# Patient Record
Sex: MALE | Race: WHITE | NOT HISPANIC OR LATINO | Employment: OTHER | URBAN - METROPOLITAN AREA
[De-identification: names, ages, dates, MRNs, and addresses within clinical notes are randomized per-mention and may not be internally consistent; named-entity substitution may affect disease eponyms.]

---

## 2023-08-01 ENCOUNTER — HOSPITAL ENCOUNTER (OUTPATIENT)
Dept: DATA CONVERSION | Facility: HOSPITAL | Age: 63
End: 2023-08-01
Attending: UROLOGY | Admitting: UROLOGY
Payer: MEDICARE

## 2023-08-01 DIAGNOSIS — N40.1 BENIGN PROSTATIC HYPERPLASIA WITH LOWER URINARY TRACT SYMPTOMS: ICD-10-CM

## 2023-09-21 ENCOUNTER — OFFICE VISIT (OUTPATIENT)
Dept: PRIMARY CARE | Facility: CLINIC | Age: 63
End: 2023-09-21
Payer: MEDICARE

## 2023-09-21 VITALS
WEIGHT: 150 LBS | DIASTOLIC BLOOD PRESSURE: 82 MMHG | BODY MASS INDEX: 23.54 KG/M2 | OXYGEN SATURATION: 97 % | HEIGHT: 67 IN | HEART RATE: 70 BPM | SYSTOLIC BLOOD PRESSURE: 120 MMHG

## 2023-09-21 DIAGNOSIS — R97.20 ELEVATED PSA: ICD-10-CM

## 2023-09-21 DIAGNOSIS — Z53.20 SCREENING FOR HEPATITIS C DECLINED: ICD-10-CM

## 2023-09-21 DIAGNOSIS — Z12.11 SCREENING FOR COLORECTAL CANCER: ICD-10-CM

## 2023-09-21 DIAGNOSIS — Z23 NEED FOR VACCINATION: ICD-10-CM

## 2023-09-21 DIAGNOSIS — R42 DIZZINESS: ICD-10-CM

## 2023-09-21 DIAGNOSIS — N40.0 BENIGN PROSTATIC HYPERPLASIA, UNSPECIFIED WHETHER LOWER URINARY TRACT SYMPTOMS PRESENT: ICD-10-CM

## 2023-09-21 DIAGNOSIS — E78.2 MIXED HYPERLIPIDEMIA: ICD-10-CM

## 2023-09-21 DIAGNOSIS — H40.9 GLAUCOMA OF BOTH EYES, UNSPECIFIED GLAUCOMA TYPE: ICD-10-CM

## 2023-09-21 DIAGNOSIS — Z71.89 ADVANCED DIRECTIVES, COUNSELING/DISCUSSION: ICD-10-CM

## 2023-09-21 DIAGNOSIS — E55.9 VITAMIN D DEFICIENCY: ICD-10-CM

## 2023-09-21 DIAGNOSIS — I10 ESSENTIAL HYPERTENSION: ICD-10-CM

## 2023-09-21 DIAGNOSIS — Z23 ENCOUNTER FOR IMMUNIZATION: ICD-10-CM

## 2023-09-21 DIAGNOSIS — Z00.00 WELCOME TO MEDICARE PREVENTIVE VISIT: Primary | ICD-10-CM

## 2023-09-21 DIAGNOSIS — N31.9 NEUROGENIC BLADDER: ICD-10-CM

## 2023-09-21 DIAGNOSIS — Z12.12 SCREENING FOR COLORECTAL CANCER: ICD-10-CM

## 2023-09-21 DIAGNOSIS — Z53.20 HIV SCREENING DECLINED: ICD-10-CM

## 2023-09-21 PROBLEM — R33.9 URINARY RETENTION: Status: ACTIVE | Noted: 2023-09-21

## 2023-09-21 PROBLEM — E78.5 HYPERLIPIDEMIA: Status: ACTIVE | Noted: 2023-09-21

## 2023-09-21 PROBLEM — K64.9 HEMORRHOIDS: Status: RESOLVED | Noted: 2023-09-21 | Resolved: 2023-09-21

## 2023-09-21 PROBLEM — K64.8 INTERNAL HEMORRHOIDS WITHOUT COMPLICATION: Status: ACTIVE | Noted: 2023-09-21

## 2023-09-21 PROBLEM — K64.9 HEMORRHOIDS: Status: ACTIVE | Noted: 2023-09-21

## 2023-09-21 PROBLEM — K64.8 INTERNAL HEMORRHOIDS WITHOUT COMPLICATION: Status: RESOLVED | Noted: 2023-09-21 | Resolved: 2023-09-21

## 2023-09-21 PROCEDURE — 99214 OFFICE O/P EST MOD 30 MIN: CPT | Performed by: FAMILY MEDICINE

## 2023-09-21 PROCEDURE — 90662 IIV NO PRSV INCREASED AG IM: CPT | Performed by: FAMILY MEDICINE

## 2023-09-21 PROCEDURE — 3079F DIAST BP 80-89 MM HG: CPT | Performed by: FAMILY MEDICINE

## 2023-09-21 PROCEDURE — G0009 ADMIN PNEUMOCOCCAL VACCINE: HCPCS | Performed by: FAMILY MEDICINE

## 2023-09-21 PROCEDURE — 93000 ELECTROCARDIOGRAM COMPLETE: CPT | Performed by: FAMILY MEDICINE

## 2023-09-21 PROCEDURE — 3074F SYST BP LT 130 MM HG: CPT | Performed by: FAMILY MEDICINE

## 2023-09-21 PROCEDURE — 1170F FXNL STATUS ASSESSED: CPT | Performed by: FAMILY MEDICINE

## 2023-09-21 PROCEDURE — G0008 ADMIN INFLUENZA VIRUS VAC: HCPCS | Performed by: FAMILY MEDICINE

## 2023-09-21 PROCEDURE — 1160F RVW MEDS BY RX/DR IN RCRD: CPT | Performed by: FAMILY MEDICINE

## 2023-09-21 PROCEDURE — 3078F DIAST BP <80 MM HG: CPT | Performed by: FAMILY MEDICINE

## 2023-09-21 PROCEDURE — 90677 PCV20 VACCINE IM: CPT | Performed by: FAMILY MEDICINE

## 2023-09-21 PROCEDURE — 1036F TOBACCO NON-USER: CPT | Performed by: FAMILY MEDICINE

## 2023-09-21 PROCEDURE — 1159F MED LIST DOCD IN RCRD: CPT | Performed by: FAMILY MEDICINE

## 2023-09-21 PROCEDURE — G0402 INITIAL PREVENTIVE EXAM: HCPCS | Performed by: FAMILY MEDICINE

## 2023-09-21 RX ORDER — IBUPROFEN 100 MG/5ML
1000 SUSPENSION, ORAL (FINAL DOSE FORM) ORAL
COMMUNITY

## 2023-09-21 RX ORDER — VIT C/E/ZN/COPPR/LUTEIN/ZEAXAN 250MG-90MG
1 CAPSULE ORAL
COMMUNITY

## 2023-09-21 RX ORDER — TAMSULOSIN HYDROCHLORIDE 0.4 MG/1
2 CAPSULE ORAL DAILY
COMMUNITY
Start: 2021-04-29

## 2023-09-21 RX ORDER — BRIMONIDINE TARTRATE 2 MG/ML
1 SOLUTION/ DROPS OPHTHALMIC 3 TIMES DAILY
COMMUNITY

## 2023-09-21 RX ORDER — BESIFLOXACIN 6 MG/ML
SUSPENSION OPHTHALMIC
COMMUNITY
Start: 2023-05-01

## 2023-09-21 RX ORDER — LOSARTAN POTASSIUM 50 MG/1
50 TABLET ORAL
COMMUNITY
Start: 2018-06-14 | End: 2023-09-21 | Stop reason: WASHOUT

## 2023-09-21 RX ORDER — PHENOL 1.4 %
1 AEROSOL, SPRAY (ML) MUCOUS MEMBRANE DAILY
COMMUNITY
Start: 2021-10-19

## 2023-09-21 RX ORDER — BIMATOPROST 0.1 MG/ML
1 SOLUTION/ DROPS OPHTHALMIC
COMMUNITY
Start: 2016-11-03 | End: 2023-11-05

## 2023-09-21 ASSESSMENT — ENCOUNTER SYMPTOMS
SHORTNESS OF BREATH: 0
CHEST TIGHTNESS: 0
ABDOMINAL PAIN: 0
DYSPHORIC MOOD: 0
CONFUSION: 0
CONSTIPATION: 0
VOMITING: 0
COUGH: 0
CHILLS: 0
SINUS PAIN: 0
NUMBNESS: 0
POLYDIPSIA: 0
ADENOPATHY: 0
POLYPHAGIA: 0
LIGHT-HEADEDNESS: 0
DEPRESSION: 0
DYSURIA: 0
NERVOUS/ANXIOUS: 0
DIZZINESS: 0
UNEXPECTED WEIGHT CHANGE: 0
NAUSEA: 0
DIAPHORESIS: 0
HEMATURIA: 0
DIARRHEA: 0
PALPITATIONS: 0
WHEEZING: 0
SORE THROAT: 0
HEADACHES: 0
FEVER: 0
FREQUENCY: 0
SINUS PRESSURE: 0
OCCASIONAL FEELINGS OF UNSTEADINESS: 1
LOSS OF SENSATION IN FEET: 0

## 2023-09-21 ASSESSMENT — PATIENT HEALTH QUESTIONNAIRE - PHQ9
2. FEELING DOWN, DEPRESSED OR HOPELESS: NOT AT ALL
SUM OF ALL RESPONSES TO PHQ9 QUESTIONS 1 AND 2: 0
SUM OF ALL RESPONSES TO PHQ9 QUESTIONS 1 AND 2: 0
1. LITTLE INTEREST OR PLEASURE IN DOING THINGS: NOT AT ALL
1. LITTLE INTEREST OR PLEASURE IN DOING THINGS: NOT AT ALL
2. FEELING DOWN, DEPRESSED OR HOPELESS: NOT AT ALL

## 2023-09-21 ASSESSMENT — ACTIVITIES OF DAILY LIVING (ADL)
TAKING_MEDICATION: INDEPENDENT
DRESSING: INDEPENDENT
DOING_HOUSEWORK: INDEPENDENT
GROCERY_SHOPPING: INDEPENDENT
BATHING: INDEPENDENT
MANAGING_FINANCES: INDEPENDENT

## 2023-09-21 NOTE — PROGRESS NOTES
"Subjective   Reason for Visit: Patel Greer is an 63 y.o. male here for a Medicare Wellness visit.     Past Medical, Surgical, and Family History reviewed and updated in chart.    Reviewed all medications by prescribing practitioner or clinical pharmacist (such as prescriptions, OTCs, herbal therapies and supplements) and documented in the medical record.    routine follow up. chronic issues as per assessment and plan.     Has been having some issues with dizziness over the past week or so. Has been monitoring blood pressures at home. Overall pretty good --> 120s over 80s. Has had one low blood pressureat 85/63 and occasional blood pressure of low 100s over 70s. Occasional systolic as high as 100 but overall in the 80s. Takes flmax 0.8 mg at bedtime for his prostate.         Patient Care Team:  Britt Dawkins MD as PCP - General  Medhat Kolb MD as Surgeon (Urology)     Review of Systems   Constitutional:  Negative for chills, diaphoresis, fever and unexpected weight change.   HENT:  Negative for congestion, sinus pressure, sinus pain, sneezing and sore throat.    Respiratory:  Negative for cough, chest tightness, shortness of breath and wheezing.    Cardiovascular:  Negative for chest pain, palpitations and leg swelling.   Gastrointestinal:  Negative for abdominal pain, constipation, diarrhea, nausea and vomiting.   Endocrine: Negative for cold intolerance, heat intolerance, polydipsia, polyphagia and polyuria.   Genitourinary:  Negative for dysuria, frequency, hematuria and urgency.   Neurological:  Negative for dizziness, syncope, light-headedness, numbness and headaches.   Hematological:  Negative for adenopathy.   Psychiatric/Behavioral:  Negative for confusion and dysphoric mood. The patient is not nervous/anxious.        Objective   Vitals:  /82 (BP Location: Left arm, Patient Position: Standing, BP Cuff Size: Adult)   Pulse 70   Ht 1.702 m (5' 7\")   Wt 68 kg (150 lb)   SpO2 97%   BMI 23.49 kg/m²   "     Physical Exam  Vitals and nursing note reviewed.   Constitutional:       General: He is not in acute distress.     Appearance: Normal appearance.   HENT:      Head: Normocephalic and atraumatic.      Nose: Nose normal.   Eyes:      Extraocular Movements: Extraocular movements intact.      Conjunctiva/sclera: Conjunctivae normal.      Pupils: Pupils are equal, round, and reactive to light.   Cardiovascular:      Rate and Rhythm: Normal rate and regular rhythm.      Heart sounds: No murmur heard.     No friction rub. No gallop.   Pulmonary:      Effort: Pulmonary effort is normal.      Breath sounds: Normal breath sounds. No wheezing, rhonchi or rales.   Abdominal:      General: Bowel sounds are normal. There is no distension.      Palpations: Abdomen is soft.      Tenderness: There is no abdominal tenderness.   Musculoskeletal:         General: Normal range of motion.      Cervical back: Normal range of motion and neck supple.   Skin:     General: Skin is warm and dry.   Neurological:      General: No focal deficit present.      Mental Status: He is alert and oriented to person, place, and time.      Deep Tendon Reflexes: Reflexes normal.   Psychiatric:         Mood and Affect: Mood normal.         Behavior: Behavior normal.         Thought Content: Thought content normal.         Judgment: Judgment normal.         Assessment/Plan   Problem List Items Addressed This Visit       Advanced directives, counseling/discussion    Current Assessment & Plan     - discussed with patient and handout provided          BPH (benign prostatic hyperplasia)    Current Assessment & Plan     - follows with urology, Dr. Kolb         Relevant Orders    CBC and Auto Differential    TSH with reflex to Free T4 if abnormal    Dizziness    Current Assessment & Plan     - check labs NSR. No ST changes   - EKG today shows  - orthostatics negative         Relevant Orders    ECG 12 Lead (Completed)    Vascular US Carotid Artery Duplex  Bilateral    Elevated PSA    Current Assessment & Plan     - follows with urology, Dr. Kolb         Relevant Orders    CBC and Auto Differential    TSH with reflex to Free T4 if abnormal    PSA    Essential hypertension    Current Assessment & Plan     - controlled off medication  - continue to monitor         Relevant Orders    CBC and Auto Differential    Comprehensive Metabolic Panel    TSH with reflex to Free T4 if abnormal    Glaucoma, bilateral    Current Assessment & Plan     - follows with ophthalmology          Mixed hyperlipidemia    Current Assessment & Plan     - Encouraged healthy lifestyle, including adequate exercise and high fiber, low fat and low carb diet.          Relevant Orders    CBC and Auto Differential    Comprehensive Metabolic Panel    Lipid Panel    Neurogenic bladder    Current Assessment & Plan     - follows with urology, Dr. Kolb         Vitamin D deficiency    Current Assessment & Plan     - check labs          Relevant Orders    CBC and Auto Differential    Vitamin D 25-Hydroxy,Total (for eval of Vitamin D levels)     Other Visit Diagnoses       Welcome to Medicare preventive visit    -  Primary    Screening for hepatitis C declined        HIV screening declined        Need for vaccination        Relevant Orders    Pneumococcal conjugate vaccine 20-valent IM (Completed)    Screening for colorectal cancer        Relevant Orders    Cologuard® colon cancer screening    Encounter for immunization        Relevant Orders    Flu vaccine, high dose seasonal, preservative free (Completed)

## 2023-09-21 NOTE — PATIENT INSTRUCTIONS
Patel S Post ,    Thank you for coming in today. We at [facility] appreciate your trust in our care. If you have any questions or concerns about the care you received today, please do not hesitate to contact us at [phone].    The following instructions were discussed today:    - I recommend getting Shingrix (shingles) vaccine at your pharmacy.    - I recommend getting COVID-19 booster at your pharmacy   - get carotid ultrasounds  - get labs  - For blood work: Nothing to eat or drink for at least 10 hours prior. Okay for water or black coffee.   - Follow up in 1 month for virtual to re-evaluate dizziness  - Follow up in 1 year.

## 2023-09-22 LAB — PROSTATE SPECIFIC AG (NG/ML) IN SER/PLAS: 13.27 NG/ML (ref 0–4)

## 2023-09-29 VITALS — HEIGHT: 67 IN | WEIGHT: 154.32 LBS | BODY MASS INDEX: 24.22 KG/M2

## 2023-09-30 NOTE — H&P
History & Physical Reviewed:   I have reviewed the History and Physical dated:  13-Jul-2023   History and Physical reviewed and relevant findings noted. Patient examined to review pertinent physical  findings.: No significant changes   Home Medications Reviewed: no changes noted   Allergies Reviewed: no changes noted       ERAS (Enhanced Recovery After Surgery):  ·  ERAS Patient: no     Consent:   COVID-19 Consent:  ·  COVID-19 Risk Consent Surgeon has reviewed key risks related to the risk of haseeb COVID-19 and if they contract COVID-19 what the risks are.       Electronic Signatures:  Medhat Kolb)  (Signed 01-Aug-2023 10:13)   Authored: History & Physical Reviewed, ERAS, Consent,  Note Completion      Last Updated: 01-Aug-2023 10:13 by Medhat Kolb)

## 2023-10-01 NOTE — OP NOTE
PROCEDURE DETAILS    Preoperative Diagnosis:  Benign localized prostatic hyperplasia with lower urinary tract symptoms (LUTS), N40.1    Postoperative Diagnosis:  Benign localized prostatic hyperplasia with lower urinary tract symptoms (LUTS), N40.1    Surgeon: Medhat Kolb  Resident/Fellow/Other Assistant: None of these were associated with this case    Procedure:  1. green light laser prostatctomy    Anesthesia: General anesthesia  Estimated Blood Loss: 1cc  Findings: 50 g prostate  Specimens(s) Collected: no,     Complications: None  Drains and/or Catheters: 20 English 30 cc balloon catheter two way, 40 cc balloon  Patient Returned To/Condition: PACU in a stable condition        Operative Report:   Patient was identified in the preoperative holding area and brought into the room, placed in a supine position. After a general anesthesia was induced, patient  was repositioned in a dorsal lithotomy position, genitalia area was prepped and draped in a routine standard fashion. A cystoscopy was performed with a 22F Olympus cystoscope, and the findings include normal anterior urethra, significant enlarged prostate,  trabeculated bladder, no tumor no stone. At this point greenlight laser fiber brought in a standard laser vaporization was performed, starting at the median lobe first, then left and right lateral lobe, finally anterior lobe both ureteral orifices are  intact as well as sphincter. There is no bleeding throughout the case, a 22 English Sharp catheter inserted and balloon inflated up to 40 cc. Bladder was irrigated and the urine is clear.  Patient extubated and returned to PACU in a stable condition.  Plan  Cipro 500 mg twice a day for 7 days  Norco x20  Appointment in 1 week for voiding trial                        Attestation:   Note Completion:  Attending Attestation I performed the procedure without a resident         Electronic Signatures:  Medhat Kolb)  (Signed 01-Aug-2023 11:31)   Authored:  Post-Operative Note, Chart Review, Note Completion      Last Updated: 01-Aug-2023 11:31 by Medhat Kolb)

## 2023-10-04 ENCOUNTER — TELEPHONE (OUTPATIENT)
Dept: PRIMARY CARE | Facility: CLINIC | Age: 63
End: 2023-10-04

## 2023-10-04 ENCOUNTER — HOSPITAL ENCOUNTER (OUTPATIENT)
Dept: VASCULAR MEDICINE | Facility: HOSPITAL | Age: 63
Discharge: HOME | End: 2023-10-04
Payer: MEDICARE

## 2023-10-04 DIAGNOSIS — R42 DIZZINESS: ICD-10-CM

## 2023-10-04 PROCEDURE — 93880 EXTRACRANIAL BILAT STUDY: CPT | Performed by: SURGERY

## 2023-10-04 PROCEDURE — 93880 EXTRACRANIAL BILAT STUDY: CPT

## 2023-10-27 ENCOUNTER — TELEMEDICINE (OUTPATIENT)
Dept: PRIMARY CARE | Facility: CLINIC | Age: 63
End: 2023-10-27
Payer: MEDICARE

## 2023-10-27 DIAGNOSIS — R42 DIZZINESS: Primary | ICD-10-CM

## 2023-10-27 PROCEDURE — 99442 PR PHYS/QHP TELEPHONE EVALUATION 11-20 MIN: CPT | Performed by: FAMILY MEDICINE

## 2023-10-27 ASSESSMENT — ENCOUNTER SYMPTOMS
POLYPHAGIA: 0
POLYDIPSIA: 0
FREQUENCY: 0
COUGH: 0
NUMBNESS: 0
LIGHT-HEADEDNESS: 0
DIZZINESS: 0
CHEST TIGHTNESS: 0
SINUS PAIN: 0
ADENOPATHY: 0
DIARRHEA: 0
HEMATURIA: 0
HEADACHES: 0
SINUS PRESSURE: 0
NERVOUS/ANXIOUS: 0
DIAPHORESIS: 0
SORE THROAT: 0
SHORTNESS OF BREATH: 0
DYSPHORIC MOOD: 0
UNEXPECTED WEIGHT CHANGE: 0
FEVER: 0
CHILLS: 0
NAUSEA: 0
WHEEZING: 0
VOMITING: 0
PALPITATIONS: 0
DYSURIA: 0
CONFUSION: 0
CONSTIPATION: 0
ABDOMINAL PAIN: 0

## 2023-10-27 NOTE — PROGRESS NOTES
Subjective   Patient ID: Patel Greer is a 63 y.o. male who presents for Follow-up.    Virtual or Telephone Consent    A telephone visit (audio only) between the patient (at the originating site) and the provider (at the distant site) was utilized to provide this telehealth service.   Verbal consent was requested and obtained from Patel Greer on this date, 10/27/23 for a telehealth visit.      HPI    Follow up on dizziness. See 9/21/23 office visit for details. EKG showed NSR. Had negative orthostatics. Carotid dopplers negative.     He states today that he thinks he has the problem solved. He was taking his flomax at bedtime previously. Now he takes it with his evening meal instead. No longer experiencing dizziness.       Review of Systems   Constitutional:  Negative for chills, diaphoresis, fever and unexpected weight change.   HENT:  Negative for congestion, sinus pressure, sinus pain, sneezing and sore throat.    Respiratory:  Negative for cough, chest tightness, shortness of breath and wheezing.    Cardiovascular:  Negative for chest pain, palpitations and leg swelling.   Gastrointestinal:  Negative for abdominal pain, constipation, diarrhea, nausea and vomiting.   Endocrine: Negative for cold intolerance, heat intolerance, polydipsia, polyphagia and polyuria.   Genitourinary:  Negative for dysuria, frequency, hematuria and urgency.   Neurological:  Negative for dizziness, syncope, light-headedness, numbness and headaches.   Hematological:  Negative for adenopathy.   Psychiatric/Behavioral:  Negative for confusion and dysphoric mood. The patient is not nervous/anxious.          Assessment/Plan   Problem List Items Addressed This Visit       Dizziness - Primary     - EKG NSR  - orthostatics negative  - carotid dopplers negative  - has completely resolved now that he is taking his flomax with dinner instead of at bedtime.             This telephone visit lasted approximately 11 minutes.

## 2023-10-27 NOTE — ASSESSMENT & PLAN NOTE
- EKG NSR  - orthostatics negative  - carotid dopplers negative  - has completely resolved now that he is taking his flomax with dinner instead of at bedtime.

## 2023-10-27 NOTE — PATIENT INSTRUCTIONS
Patel S Post ,    Thank you for coming in today. We at [facility] appreciate your trust in our care. If you have any questions or concerns about the care you received today, please do not hesitate to contact us at [phone].    The following instructions were discussed today:    - Follow up 9/19/24 as scheduled.

## 2023-11-14 LAB — NONINV COLON CA DNA+OCC BLD SCRN STL QL: NEGATIVE

## 2024-03-19 ENCOUNTER — LAB (OUTPATIENT)
Dept: LAB | Facility: LAB | Age: 64
End: 2024-03-19
Payer: MEDICARE

## 2024-03-19 DIAGNOSIS — R97.20 ELEVATED PROSTATE SPECIFIC ANTIGEN (PSA): Primary | ICD-10-CM

## 2024-03-19 DIAGNOSIS — R97.20 ELEVATED PROSTATE SPECIFIC ANTIGEN (PSA): ICD-10-CM

## 2024-03-19 LAB — PSA SERPL-MCNC: 12.9 NG/ML

## 2024-03-19 PROCEDURE — 36415 COLL VENOUS BLD VENIPUNCTURE: CPT

## 2024-03-19 PROCEDURE — 84153 ASSAY OF PSA TOTAL: CPT

## 2024-04-26 ENCOUNTER — OFFICE VISIT (OUTPATIENT)
Dept: UROLOGY | Facility: CLINIC | Age: 64
End: 2024-04-26
Payer: MEDICARE

## 2024-04-26 DIAGNOSIS — N40.0 BENIGN PROSTATIC HYPERPLASIA, UNSPECIFIED WHETHER LOWER URINARY TRACT SYMPTOMS PRESENT: ICD-10-CM

## 2024-04-26 DIAGNOSIS — R97.20 ELEVATED PSA: Primary | ICD-10-CM

## 2024-04-26 LAB
POC BILIRUBIN, URINE: NEGATIVE
POC BLOOD, URINE: NEGATIVE
POC GLUCOSE, URINE: NEGATIVE MG/DL
POC KETONES, URINE: NEGATIVE MG/DL
POC LEUKOCYTES, URINE: NEGATIVE
POC NITRITE,URINE: NEGATIVE
POC PH, URINE: 7 PH
POC PROTEIN, URINE: NEGATIVE MG/DL
POC SPECIFIC GRAVITY, URINE: 1.01
POC UROBILINOGEN, URINE: 0.2 EU/DL

## 2024-04-26 PROCEDURE — 1159F MED LIST DOCD IN RCRD: CPT | Performed by: UROLOGY

## 2024-04-26 PROCEDURE — 99214 OFFICE O/P EST MOD 30 MIN: CPT | Performed by: UROLOGY

## 2024-04-26 PROCEDURE — 81002 URINALYSIS NONAUTO W/O SCOPE: CPT | Performed by: UROLOGY

## 2024-04-26 RX ORDER — FINASTERIDE 5 MG/1
5 TABLET, FILM COATED ORAL DAILY
Qty: 30 TABLET | Refills: 0 | Status: SHIPPED | OUTPATIENT
Start: 2024-04-26 | End: 2024-05-21 | Stop reason: SDUPTHER

## 2024-04-26 NOTE — PROGRESS NOTES
"4/26/2024  Complaining some dizzy spell, voiding well, on Flomax 0.8 mg daily    Patient has no nausea, no vomiting, no fever.    GRAYSON: Deferred    PSA: 12.9, stable    We discussed benign prostate hypertrophy, Flomax, dizzy spell  We discussed the DC Flomax, Proscar trial 5 mg daily for 30 days  We discussed elevated PSA but stable, repeat prostate biopsy versus continue monitoring PSA  All the questions were answered, the patient expressed understanding and agreed to the plan.    Impression  History of urinary retention  BPH, status post the PVP  Urinary retention  Elevated PSA, multiple negative biopsy     Plan  DC Flomax  Proscar 5 mg daily x 30-day, call back for refill  Yearly GRAYSON and PSA      Chief Complaint   Patient presents with    Benign Prostatic Hypertrophy     Patient here today for yearly follow up due to BPH.  Patient voiding well, nocturia x 2  Patient states the Flomax has been making him very dizzy, would like to discuss.    PSA 3/19/2024  12.90    Last Visit Plan:  Continue Flomax 0.8 mg  Check PSA          Physical Exam     TODAYS LAB RESULTS:  Component  Ref Range & Units 08:08   POC Glucose, Urine  NEGATIVE mg/dl NEGATIVE   POC Bilirubin, Urine  NEGATIVE NEGATIVE   POC Ketones, Urine  NEGATIVE mg/dl NEGATIVE   POC Specific Gravity, Urine  1.005 - 1.035 1.010   POC Blood, Urine  NEGATIVE NEGATIVE   POC PH, Urine  No Reference Range Established PH 7.0   POC Protein, Urine  NEGATIVE, 30 (1+) mg/dl NEGATIVE   POC Urobilinogen, Urine  0.2, 1.0 EU/DL 0.2   Poc Nitrite, Urine  NEGATIVE NEGATIVE   POC Leukocytes, Urine  NEGATIVE NEGATIVE       ASSESSMENT&PLAN:      IMPRESSIONS:     08/14/2023  Patient here for second voiding trial     Patient here today 1 week f/u. Patient presents with Indwelling catheter placed 8/8/2023.  Patient had green light laser 8/1/2023. Patient would like to talk to Dr. Kolb regarding next \"steps.\"  Sowmya Gibbons LPN     We discussed benign prostate hypertrophy, status post " the PVP, failed voiding trial x1 and a repeat voiding trial needed  We discussed continue Flomax 0.8 mg daily for now  All the questions were answered, the patient expressed understanding and agreed to the plan.     Impression  Intractable urinary retention  BPH  Urinary retention  Elevated PSA, multiple negative biopsy     Plan  Voiding trial x 2, call back 3 PM  Appointment in 1 week for PVR  Continue Flomax 0.8 mg daily for now        08/07/2023  Did well after PVP     Patient has no nausea, no vomiting, no fever.     Exam: Sharp catheter in place, urine clear     Catheter was removed without difficulty     Patient here today for 1wk follow up after green light laser prostatectomy done 8/01/23.      Patient would like to know if he is to continue the Flomax 0.8mg daily.  He also wants to know if he is able to do any exercising/if he has restrictions.      -Kenneth LACEY     We discussed the benign prostate hypertrophy, status post PVP greenlight laser prostatectomy, voiding trial  We discussed continue Flomax 2 a day until finished the bottle  All the questions were answered, the patient expressed understanding and agreed to the plan.     Impression  Intractable urinary retention  BPH  Urinary retention  Elevated PSA, multiple negative biopsy     Plan  Voiding trial, call back 3 PM  Appointment in 1 week for PVR  Continue Flomax 0.8 mg daily for now        07/13/2023  Cystoscopy     A cystoscopy was performed under local without difficulty     Findings: Normal anterior urethra, mild to moderate enlarged prostate, mild trabeculated bladder no tumor no stone     Pain evaluation: 0/10 before, 2/10 after.     Transrectal ultrasound prostate performed without difficulty     Volume 55 cc     We discussed benign prostate hypertrophy, intractable urinary retention  We discussed the PVP greenlight laser prostatectomy, indication risk of benefit and alternative,  All the questions were answered, the patient expressed  understanding and agreed to the plan.        Impression  Intractable urinary retention  BPH  Urinary retention  Elevated PSA, multiple negative biopsy     Plan  Continue Sharp catheter for now  PVP greenlight laser prostatectomy  Appointment 1 week after        07/07/2023 3:06 addendum     Patient unable to void, PVR more than 350 cc, very anxious     Plan  Sharp catheter  Prostate ultrasound  Cystoscopy  Likely proceed PVP greenlight laser prostatectomy        07/07/2023  Here for second voiding trial     Exam: Sharp catheter in place, urine clear yellow     Sharp catheter was removed without difficulty     Patient here today for 2nd 1wk voiding trial. He failed last voiding trial done 6/30/23.   Urine culture 6/22/23 negative.      Patient is wondering if he needs needs to increase Flomax dosage to help with voiding issues? He also had Cataract surgery in April and is wondering if this is related to retention as well.   Patient is very anxious about voiding trial and why he is having these issues.     -JMorgenstern CMA     We discussed benign prostate hypertrophy, acute urinary retention, second voiding trial   We discussed increase Flomax to 0.8 mg daily  We discussed possible BPH work-up including ultrasound and cystoscopy  All the questions were answered, the patient expressed understanding and agreed to the plan.     Impression  Acute urinary retention  BPH  Urinary retention  Elevated PSA     Plan  Voiding trial today, call back 2 PM  Change Flomax to 0.8 mg daily  Keep regular appointment        06/30/2023  Acute onset of urinary retention, Sharp catheter was inserted a week ago in ER     Patient has no nausea, no vomiting, no fever.     Exam: Sharp catheter in place, urine clear yellow     Catheter was removed without difficulty     Patient here for ED follow up. Patient went to ER 06/22/23 complaints of urinary retention. Catheter was inserted and 750ml was drained from bladder. He is to follow up here,  there has been blood around the tip of his penis. No blood in bag. He is still taking flomax daily.      We discussed benign prostate hypertrophy, acute urinary retention, voiding trial  We discussed continue Flomax use  All the questions were answered, the patient expressed understanding and agreed to the plan.     Impression  Acute urinary retention  BPH  Urinary retention  Elevated PSA     Plan  Voiding trial today, call back 2 PM  Call back in 1 week  Keep regular appointment           05/15/2023  Voiding well, nocturia 0-1, on Flomax 0.4 mg daily     Patient has no nausea, no vomiting, no fever.      GRAYSON: 2+, no nodule     PSA: Pending     Patient here today for year check of BPH, elevated PSA, urinary retention. He was to continue Flomax 0.8mg daily and had catheter in for 1wk, with successful voiding trial on 6/27/22. He did not follow up in office for PVR.   Patient denies dysuria, burning, hematuria. Nocturia x1 occasionally. Denies weak or intermittent urinary stream, states he feels he can void fine with no issues.  Patient is taking Flomax 0.4mg daily- he will need a refill of this.   PVR 53ml  -JMorgenstern CMA     IO Glucose - Urine Negative REQUIRED    IO Bilirubin Negative REQUIRED    IO Ketones Negative REQUIRED    IO Specific Gravity 1.020 REQUIRED    IO Blood Negative REQUIRED    IO pH 7.0 REQUIRED    IO Protein, Urine Negative REQUIRED    IO Urobilinogen Normal (0.2-1.0 mg/dl) REQUIRED    IO Nitrite, Urine Negative REQUIRED    IO Leukocytes Negative      We discussed the benign prostate hypertrophy with moderate voiding symptoms on Flomax  We discussed PSA screening once a year  All the questions were answered, the patient expressed understanding and agreed to the plan.     Impression  BPH  Urinary retention  Elevated PSA     Plan  PSA now  Continue Flomax 0.4 mg daily   Yearly GRAYSON and PSA        06/20/2022  Patient developed an acute onset urinary retention     Patient has no nausea, no  vomiting, no fever.     Patient here today not able to void. Has not been able to urinate since 8 am and took flomax and still can not urinate. Patient had 3 cups of coffee this morning. He has not drank anything else since. Patient tried walking and exercising, /112    PVR 553ml     We discussed acute urinary retention, Sharp catheter  All the questions were answered, the patient expressed understanding and agreed to the plan.     Impression  BPH  Urinary retention  Elevated PSA     Plan  Continue Flomax 0.8 mg daily   Sharp catheter in 1 week  Appointment in 1 week           05/06/2022  Voiding well, on Flomax 0.4 mg daily     Patient has no nausea, no vomiting, no fever.     GRAYSON: 2+, no nodule     Patient is here today for an annual follow up for BPH, elevated PSA     PSA 4/21/22 11.54     Test Result Flag Reference Goal Last Verified    IO Glucose - Urine Negative    IO Bilirubin Negative    IO Ketones Negative    IO Specific Gravity 1.020    IO Blood Trace    IO pH 7.0    IO Protein, Urine Negative    IO Urobilinogen Normal (0.2-1.0 mg/dl)    IO Nitrite, Urine Negative    IO Leukocytes Negative      We discussed the benign prostate hypertrophy, urinary retention, voiding trial  We discussed Flomax trial 0.4 mg daily x30  We discussed the PSA elevation, repeat PSA  All the questions were answered, the patient expressed understanding and agreed to the plan.     Impression  BPH  Urinary retention  Elevated PSA     Plan  Continue Flomax 0.4 mg daily   Yearly GRAYSON and a PSA     05/06/2021  Voiding well on Flomax 0.4 mg daily     Patient has no nausea, no vomiting, no fever.        IO Ultrasound, measurement post-void resid urine and/or bl cap; no imag         [id number] 01:47PM         Medhat Kolb     Test Name       Result     Flag        Reference  IO Ultrasound, measurement post void resid urine and/or bl cap; non-imag       11 ml/min                                                                                                                       IO UA (automated w/o microscopy)           [id number] 01:40PM         Medhat Kolb     Test Name       Result     Flag        Reference  IO Glucose - Urine         Negative                  IO Bilirubin       Negative                  IO Ketones      Negative                  IO Specific Gravity         1.030                       IO Blood          Trace                       IO pH               5.5                           IO Protein, Urine            Negative                  IO Urobilinogen                                              Normal (0.2-1.0 mg/dl)  IO Nitrite, Urine              Negative                  IO Leukocytes Negative                  IO Glucose - Urine         Negative                  IO Bilirubin       Negative                  IO Ketones      Negative                  IO Specific Gravity         1.030                       IO Blood          Trace                       IO pH               5.5                           IO Protein, Urine            Negative                  IO Urobilinogen                                              Normal (0.2-1.0 mg/dl)  IO Nitrite, Urine              Negative                  IO Leukocytes Negative                                                            We discussed the benign prostate hypertrophy, urinary retention, voiding trial  We discussed Flomax trial 0.4 mg daily x30  We discussed the PSA elevation, repeat PSA  All the questions were answered, the patient expressed understanding and agreed to the plan.     Impression  BPH  Urinary retention  Elevated PSA     Plan  Continue Flomax 0.4 mg daily   check PSA   Yearly GRAYSON and PSA        04/292021  Patient developed an acute onset urinary retention, a Sharp catheter was inserted in ER 6 days ago     Patient has no nausea, no vomiting, no fever.     Exam: Sharp catheter in place, urine clear yellow     Catheter was removed without difficulty     We discussed  the benign prostate hypertrophy, urinary retention, voiding trial  We discussed Flomax trial 0.4 mg daily x30  We discussed the PSA elevation, repeat PSA  All the questions were answered, the patient expressed understanding and agreed to the plan.     Impression  BPH  Urinary retention  Elevated PSA     Plan  Voiding trial today, call back 3 PM  Flomax 0.4 mg daily trial x30  Appointment in 1 week for PVR  We will check PSA then     I spent 25 minutes with this patient. Greater than 50% of this time was spent in counseling and/or coordination of care     8/31/17  Patient feels 100% better     CT abdomen and pelvis without contrast  IMPRESSION:  No obstructive uropathy. No hydronephrosis or hydroureter.     Pinpoint nonobstructing left mid to lower pole calculus and grossly stable 1.4cm left renal cyst.     We discussed the CT scan results, musculoskeletal, pain. We also discussed elevated PSA, indication all repeat prostate biopsy but the patient will decided not to proceed right now., Patient expressed understanding and agreed to the planned.     Plan   watch elevated PSA  PSA in 6 months  Appointment in 6 months     8/28/17  Acute onset of left flank pain, 8 out of 10, pains related to the position of the body     No fever no voiding problem     We discussed the flank pain, causes #1 musculoskeletal #2 kidney etc. all questions answered, patient expressed understanding and agreed to the plan     Plan  Norco 30 tablets  CT abdomen and pelvis without contrast for left acute onset of flank pain  Appointment Thursday  We'll discuss the repeat prostate biopsy again     8/4/17  patient's back for repeat PSA     Repeat PSA 11.88      (4/26/17) renal ultrasound 1.3 cm left kidney stone     We discussed persistent elevated PSA, indication P prostate biopsy; also discussed the renal ultrasound results showing left kidney stone, all questions answered, patient agreed to the planned     Plan  KUB for left kidney stone,  possible ESWL if positive;  Patient will talk to his wife about the repeat prostate biopsy and call back  Appointment in 1 month        5/5/17  Voiding well, no complaints     PSA up to 12.38     We discussed elevated PSA, repeat prostate biopsy, risk and benefit. Patient hesitated to have biopsy again and rather to repeat the PSA in 3 months. With fully understanding the risk and benefit.     Plan  PSA in 3 months  Appointment in 3 months        Office  11/18/16 elevated PSA  Voiding well, nocturia Ã--1 PSA 9  GRAYSON 3+  Renal ultrasound renal cyst no change  Plan  PSA in 6 months, when year  Appointment in 1 year     11/20/15 cyst on kidney  Voiding well  Repeat renal ultrasound on September left small renal cyst unchanged  PSA stable  Plan yearly GRAYSON and PSA  Repeat renal ultrasound in a year     PSA  3/19/2024 12.9  9/22/2023 13.27   4/21/22 11.54  7/27/17 11.88  3/24/1712.38  11/13/16 9  11/6/15 7.37  6/5/15 8.24  5/12/14 7.18  11/11/17 9.8  3/15/13 8.0     Surgery  8/1/23 PVP greenlight laser prostatectomy  12/26/16 prostate biopsy negative  4/25/13 prostate biopsy negative

## 2024-05-21 ENCOUNTER — TELEPHONE (OUTPATIENT)
Dept: UROLOGY | Facility: CLINIC | Age: 64
End: 2024-05-21
Payer: MEDICARE

## 2024-05-21 DIAGNOSIS — N40.0 BENIGN PROSTATIC HYPERPLASIA, UNSPECIFIED WHETHER LOWER URINARY TRACT SYMPTOMS PRESENT: ICD-10-CM

## 2024-05-21 RX ORDER — FINASTERIDE 5 MG/1
5 TABLET, FILM COATED ORAL DAILY
Qty: 30 TABLET | Refills: 0 | Status: SHIPPED | OUTPATIENT
Start: 2024-05-21 | End: 2024-08-19

## 2024-09-19 ENCOUNTER — APPOINTMENT (OUTPATIENT)
Dept: PRIMARY CARE | Facility: CLINIC | Age: 64
End: 2024-09-19
Payer: MEDICARE

## 2024-09-19 VITALS
BODY MASS INDEX: 19.93 KG/M2 | TEMPERATURE: 97.7 F | SYSTOLIC BLOOD PRESSURE: 130 MMHG | HEART RATE: 74 BPM | OXYGEN SATURATION: 98 % | HEIGHT: 67 IN | DIASTOLIC BLOOD PRESSURE: 80 MMHG | RESPIRATION RATE: 16 BRPM | WEIGHT: 127 LBS

## 2024-09-19 DIAGNOSIS — Z12.11 COLON CANCER SCREENING: ICD-10-CM

## 2024-09-19 DIAGNOSIS — Z11.59 NEED FOR HEPATITIS C SCREENING TEST: ICD-10-CM

## 2024-09-19 DIAGNOSIS — H40.9 GLAUCOMA OF BOTH EYES, UNSPECIFIED GLAUCOMA TYPE: ICD-10-CM

## 2024-09-19 DIAGNOSIS — E78.2 MIXED HYPERLIPIDEMIA: ICD-10-CM

## 2024-09-19 DIAGNOSIS — N40.0 BENIGN PROSTATIC HYPERPLASIA, UNSPECIFIED WHETHER LOWER URINARY TRACT SYMPTOMS PRESENT: ICD-10-CM

## 2024-09-19 DIAGNOSIS — R97.20 ELEVATED PSA: ICD-10-CM

## 2024-09-19 DIAGNOSIS — Z00.00 MEDICARE ANNUAL WELLNESS VISIT, SUBSEQUENT: Primary | ICD-10-CM

## 2024-09-19 DIAGNOSIS — N31.9 NEUROGENIC BLADDER: ICD-10-CM

## 2024-09-19 DIAGNOSIS — I10 ESSENTIAL HYPERTENSION: ICD-10-CM

## 2024-09-19 DIAGNOSIS — E55.9 VITAMIN D DEFICIENCY: ICD-10-CM

## 2024-09-19 DIAGNOSIS — R63.4 WEIGHT LOSS: ICD-10-CM

## 2024-09-19 DIAGNOSIS — Z23 ENCOUNTER FOR IMMUNIZATION: ICD-10-CM

## 2024-09-19 PROBLEM — R42 DIZZINESS: Status: RESOLVED | Noted: 2023-09-21 | Resolved: 2024-09-19

## 2024-09-19 PROBLEM — E46 PROTEIN-CALORIE MALNUTRITION, UNSPECIFIED SEVERITY (MULTI): Status: ACTIVE | Noted: 2024-09-19

## 2024-09-19 PROBLEM — E46 PROTEIN-CALORIE MALNUTRITION, UNSPECIFIED SEVERITY (MULTI): Status: RESOLVED | Noted: 2024-09-19 | Resolved: 2024-09-19

## 2024-09-19 PROCEDURE — 99214 OFFICE O/P EST MOD 30 MIN: CPT | Performed by: FAMILY MEDICINE

## 2024-09-19 PROCEDURE — 3008F BODY MASS INDEX DOCD: CPT | Performed by: FAMILY MEDICINE

## 2024-09-19 PROCEDURE — 1036F TOBACCO NON-USER: CPT | Performed by: FAMILY MEDICINE

## 2024-09-19 PROCEDURE — G0439 PPPS, SUBSEQ VISIT: HCPCS | Performed by: FAMILY MEDICINE

## 2024-09-19 PROCEDURE — 1160F RVW MEDS BY RX/DR IN RCRD: CPT | Performed by: FAMILY MEDICINE

## 2024-09-19 PROCEDURE — 3075F SYST BP GE 130 - 139MM HG: CPT | Performed by: FAMILY MEDICINE

## 2024-09-19 PROCEDURE — 1123F ACP DISCUSS/DSCN MKR DOCD: CPT | Performed by: FAMILY MEDICINE

## 2024-09-19 PROCEDURE — 3079F DIAST BP 80-89 MM HG: CPT | Performed by: FAMILY MEDICINE

## 2024-09-19 PROCEDURE — 90662 IIV NO PRSV INCREASED AG IM: CPT | Performed by: FAMILY MEDICINE

## 2024-09-19 PROCEDURE — G0008 ADMIN INFLUENZA VIRUS VAC: HCPCS | Performed by: FAMILY MEDICINE

## 2024-09-19 PROCEDURE — 1159F MED LIST DOCD IN RCRD: CPT | Performed by: FAMILY MEDICINE

## 2024-09-19 PROCEDURE — 1170F FXNL STATUS ASSESSED: CPT | Performed by: FAMILY MEDICINE

## 2024-09-19 RX ORDER — BIMATOPROST 0.1 MG/ML
1 SOLUTION/ DROPS OPHTHALMIC NIGHTLY
COMMUNITY

## 2024-09-19 RX ORDER — DORZOLAMIDE HYDROCHLORIDE AND TIMOLOL MALEATE 20; 5 MG/ML; MG/ML
1 SOLUTION/ DROPS OPHTHALMIC 2 TIMES DAILY
COMMUNITY

## 2024-09-19 ASSESSMENT — ENCOUNTER SYMPTOMS
SORE THROAT: 0
COUGH: 0
CONFUSION: 0
POLYDIPSIA: 0
DIZZINESS: 0
DIARRHEA: 0
NAUSEA: 0
DIAPHORESIS: 0
LIGHT-HEADEDNESS: 0
HEMATURIA: 0
CHEST TIGHTNESS: 0
ABDOMINAL PAIN: 0
HEADACHES: 0
NUMBNESS: 0
WHEEZING: 0
FEVER: 0
DYSPHORIC MOOD: 0
SHORTNESS OF BREATH: 0
CHILLS: 0
CONSTIPATION: 0
NERVOUS/ANXIOUS: 0
SINUS PRESSURE: 0
UNEXPECTED WEIGHT CHANGE: 0
PALPITATIONS: 0
FREQUENCY: 0
SINUS PAIN: 0
VOMITING: 0
DYSURIA: 0
POLYPHAGIA: 0
ADENOPATHY: 0

## 2024-09-19 ASSESSMENT — PATIENT HEALTH QUESTIONNAIRE - PHQ9
1. LITTLE INTEREST OR PLEASURE IN DOING THINGS: NOT AT ALL
SUM OF ALL RESPONSES TO PHQ9 QUESTIONS 1 AND 2: 0
2. FEELING DOWN, DEPRESSED OR HOPELESS: NOT AT ALL

## 2024-09-19 ASSESSMENT — ACTIVITIES OF DAILY LIVING (ADL)
BATHING: INDEPENDENT
MANAGING_FINANCES: INDEPENDENT
GROCERY_SHOPPING: INDEPENDENT
DRESSING: INDEPENDENT
TAKING_MEDICATION: INDEPENDENT
DOING_HOUSEWORK: INDEPENDENT

## 2024-09-19 NOTE — PATIENT INSTRUCTIONS
Patel GARCES Post ,    Thank you for coming in today. We at [facility] appreciate your trust in our care. If you have any questions or concerns about the care you received today, please do not hesitate to contact us at [phone].    The following instructions were discussed today:    - get labs. For blood work: Nothing to eat or drink for at least 10 hours prior. Okay for water or black coffee.   - I recommend the following vaccines at the pharmacy: Shingrix, COVID-19  - Follow up in 1 month for nurse visit for weight check   - Follow up in 3 months for routine visit

## 2024-09-19 NOTE — ASSESSMENT & PLAN NOTE
- has lost 23 pounds in the past year  - was intentional weight loss at first but now he keeps losing despite decreasing his exercise and starting to eat ice cream again  - negative cologuard Nov. 2023  - has history of elevated PSA and follows with urology. Negative prostate biopsy 2013  - check labs     Orders:    Testosterone, total and free; Future

## 2024-09-19 NOTE — ASSESSMENT & PLAN NOTE
- controlled off medication  - continue to monitor    Orders:    CBC and Auto Differential; Future    Comprehensive Metabolic Panel; Future    TSH with reflex to Free T4 if abnormal; Future

## 2024-09-19 NOTE — ASSESSMENT & PLAN NOTE
- check labs     Orders:    CBC and Auto Differential; Future    Vitamin D 25-Hydroxy,Total (for eval of Vitamin D levels); Future    Vitamin D 25-Hydroxy,Total (for eval of Vitamin D levels); Future

## 2024-09-19 NOTE — PROGRESS NOTES
"Subjective   Reason for Visit: Patel Greer is an 64 y.o. male here for a Medicare Wellness visit.     Past Medical, Surgical, and Family History reviewed and updated in chart.    Reviewed all medications by prescribing practitioner or clinical pharmacist (such as prescriptions, OTCs, herbal therapies and supplements) and documented in the medical record.    HPI  routine follow up. chronic issues as per assessment and plan.     Following with Dr. Bryan (ophthalmology) for glaucoma. Notes reviewed. No changes made.     Following with Dr. Kolb for elevated PSA and BPH. Note reviewed. Stopped flomax due to dizziness     Patient Care Team:  Britt Dawkins MD as PCP - General  Britt Dawkins MD as PCP - [employer] PCP  Medhat Kolb MD as Surgeon (Urology)  Elbert Bryan MD as Referring Physician (Ophthalmology)     Review of Systems   Constitutional:  Negative for chills, diaphoresis, fever and unexpected weight change.   HENT:  Negative for congestion, sinus pressure, sinus pain, sneezing and sore throat.    Respiratory:  Negative for cough, chest tightness, shortness of breath and wheezing.    Cardiovascular:  Negative for chest pain, palpitations and leg swelling.   Gastrointestinal:  Negative for abdominal pain, constipation, diarrhea, nausea and vomiting.   Endocrine: Negative for cold intolerance, heat intolerance, polydipsia, polyphagia and polyuria.   Genitourinary:  Negative for dysuria, frequency, hematuria and urgency.   Neurological:  Negative for dizziness, syncope, light-headedness, numbness and headaches.   Hematological:  Negative for adenopathy.   Psychiatric/Behavioral:  Negative for confusion and dysphoric mood. The patient is not nervous/anxious.        Objective   Vitals:  /80 (BP Location: Right arm, Patient Position: Sitting, BP Cuff Size: Adult)   Pulse 74   Temp 36.5 °C (97.7 °F) (Oral)   Resp 16   Ht 1.702 m (5' 7\")   Wt 57.6 kg (127 lb)   SpO2 98%   BMI 19.89 " kg/m²       Physical Exam  Vitals and nursing note reviewed.   Constitutional:       General: He is not in acute distress.     Appearance: Normal appearance.   HENT:      Head: Normocephalic and atraumatic.      Nose: Nose normal.   Eyes:      Extraocular Movements: Extraocular movements intact.      Conjunctiva/sclera: Conjunctivae normal.      Pupils: Pupils are equal, round, and reactive to light.   Cardiovascular:      Rate and Rhythm: Normal rate and regular rhythm.      Heart sounds: No murmur heard.     No friction rub. No gallop.   Pulmonary:      Effort: Pulmonary effort is normal.      Breath sounds: Normal breath sounds. No wheezing, rhonchi or rales.   Abdominal:      General: Bowel sounds are normal. There is no distension.      Palpations: Abdomen is soft.      Tenderness: There is no abdominal tenderness.   Musculoskeletal:         General: Normal range of motion.      Cervical back: Normal range of motion and neck supple.   Skin:     General: Skin is warm and dry.   Neurological:      General: No focal deficit present.      Mental Status: He is alert and oriented to person, place, and time.      Deep Tendon Reflexes: Reflexes normal.   Psychiatric:         Mood and Affect: Mood normal.         Behavior: Behavior normal.         Thought Content: Thought content normal.         Judgment: Judgment normal.       Assessment & Plan  Medicare annual wellness visit, subsequent         Weight loss  - has lost 23 pounds in the past year  - was intentional weight loss at first but now he keeps losing despite decreasing his exercise and starting to eat ice cream again  - negative cologuard Nov. 2023  - has history of elevated PSA and follows with urology. Negative prostate biopsy 2013  - check labs     Orders:  •  Testosterone, total and free; Future    Body mass index (BMI) 19.9 or less, adult  - has lost 23 pounds in the past year  - was intentional weight loss at first but now he keeps losing despite  decreasing his exercise and starting to eat ice cream again    Orders:  •  Testosterone, total and free; Future    Mixed hyperlipidemia  - Encouraged healthy lifestyle, including adequate exercise and high fiber, low fat and low carb diet.     Orders:  •  CBC and Auto Differential; Future  •  Comprehensive Metabolic Panel; Future  •  Lipid Panel; Future    Benign prostatic hyperplasia, unspecified whether lower urinary tract symptoms present  - follows with urology, Dr. Kolb    Orders:  •  CBC and Auto Differential; Future  •  TSH with reflex to Free T4 if abnormal; Future  •  Testosterone, total and free; Future    Elevated PSA  - follows with urology, Dr. Kolb  - negative prostate biopsy 2013    Orders:  •  CBC and Auto Differential; Future  •  TSH with reflex to Free T4 if abnormal; Future  •  PSA; Future    Essential hypertension  - controlled off medication  - continue to monitor    Orders:  •  CBC and Auto Differential; Future  •  Comprehensive Metabolic Panel; Future  •  TSH with reflex to Free T4 if abnormal; Future    Vitamin D deficiency  - check labs     Orders:  •  CBC and Auto Differential; Future  •  Vitamin D 25-Hydroxy,Total (for eval of Vitamin D levels); Future  •  Vitamin D 25-Hydroxy,Total (for eval of Vitamin D levels); Future    Glaucoma of both eyes, unspecified glaucoma type  - follows with ophthalmology          Neurogenic bladder  - follows with urology, Dr. Kolb         Encounter for immunization  - high dose flu shot administered today   - recommended the following vaccines at the pharmacy: Shingrix, COVID-19    Orders:  •  Flu vaccine, trivalent, preservative free, HIGH-DOSE, age 65y+ (Fluzone)    Need for hepatitis C screening test    Orders:  •  Hepatitis C Antibody; Future    Colon cancer screening  - negative cologuard November 2023

## 2024-09-19 NOTE — ASSESSMENT & PLAN NOTE
- Encouraged healthy lifestyle, including adequate exercise and high fiber, low fat and low carb diet.     Orders:    CBC and Auto Differential; Future    Comprehensive Metabolic Panel; Future    Lipid Panel; Future

## 2024-09-19 NOTE — ASSESSMENT & PLAN NOTE
- high dose flu shot administered today   - recommended the following vaccines at the pharmacy: Shingrix, COVID-19    Orders:    Flu vaccine, trivalent, preservative free, HIGH-DOSE, age 65y+ (Fluzone)

## 2024-09-19 NOTE — ASSESSMENT & PLAN NOTE
- follows with urology, Dr. Kolb  - negative prostate biopsy 2013    Orders:    CBC and Auto Differential; Future    TSH with reflex to Free T4 if abnormal; Future    PSA; Future

## 2024-09-19 NOTE — ASSESSMENT & PLAN NOTE
- has lost 23 pounds in the past year  - was intentional weight loss at first but now he keeps losing despite decreasing his exercise and starting to eat ice cream again    Orders:    Testosterone, total and free; Future

## 2024-09-20 ENCOUNTER — TELEPHONE (OUTPATIENT)
Dept: PRIMARY CARE | Facility: CLINIC | Age: 64
End: 2024-09-20

## 2024-09-20 ENCOUNTER — LAB (OUTPATIENT)
Dept: LAB | Facility: LAB | Age: 64
End: 2024-09-20
Payer: MEDICARE

## 2024-09-20 DIAGNOSIS — R63.4 WEIGHT LOSS: ICD-10-CM

## 2024-09-20 DIAGNOSIS — R97.20 ELEVATED PSA: ICD-10-CM

## 2024-09-20 DIAGNOSIS — N40.0 BENIGN PROSTATIC HYPERPLASIA, UNSPECIFIED WHETHER LOWER URINARY TRACT SYMPTOMS PRESENT: ICD-10-CM

## 2024-09-20 DIAGNOSIS — I10 ESSENTIAL HYPERTENSION: ICD-10-CM

## 2024-09-20 DIAGNOSIS — E05.90 HYPERTHYROIDISM: ICD-10-CM

## 2024-09-20 DIAGNOSIS — R79.89 LOW TSH LEVEL: Primary | ICD-10-CM

## 2024-09-20 DIAGNOSIS — E78.2 MIXED HYPERLIPIDEMIA: ICD-10-CM

## 2024-09-20 DIAGNOSIS — Z11.59 NEED FOR HEPATITIS C SCREENING TEST: ICD-10-CM

## 2024-09-20 DIAGNOSIS — E55.9 VITAMIN D DEFICIENCY: ICD-10-CM

## 2024-09-20 LAB
25(OH)D3 SERPL-MCNC: 48 NG/ML (ref 30–100)
ALBUMIN SERPL BCP-MCNC: 3.8 G/DL (ref 3.4–5)
ALP SERPL-CCNC: 109 U/L (ref 33–136)
ALT SERPL W P-5'-P-CCNC: 32 U/L (ref 10–52)
ANION GAP SERPL CALC-SCNC: 9 MMOL/L (ref 10–20)
AST SERPL W P-5'-P-CCNC: 24 U/L (ref 9–39)
BASOPHILS # BLD AUTO: 0.01 X10*3/UL (ref 0–0.1)
BASOPHILS NFR BLD AUTO: 0.2 %
BILIRUB SERPL-MCNC: 0.6 MG/DL (ref 0–1.2)
BUN SERPL-MCNC: 29 MG/DL (ref 6–23)
CALCIUM SERPL-MCNC: 8.9 MG/DL (ref 8.6–10.3)
CHLORIDE SERPL-SCNC: 107 MMOL/L (ref 98–107)
CHOLEST SERPL-MCNC: 162 MG/DL (ref 0–199)
CHOLESTEROL/HDL RATIO: 3.9
CO2 SERPL-SCNC: 29 MMOL/L (ref 21–32)
CREAT SERPL-MCNC: 0.65 MG/DL (ref 0.5–1.3)
EGFRCR SERPLBLD CKD-EPI 2021: >90 ML/MIN/1.73M*2
EOSINOPHIL # BLD AUTO: 0.16 X10*3/UL (ref 0–0.7)
EOSINOPHIL NFR BLD AUTO: 2.7 %
ERYTHROCYTE [DISTWIDTH] IN BLOOD BY AUTOMATED COUNT: 12.9 % (ref 11.5–14.5)
GLUCOSE SERPL-MCNC: 94 MG/DL (ref 74–99)
HCT VFR BLD AUTO: 45.6 % (ref 41–52)
HCV AB SER QL: NONREACTIVE
HDLC SERPL-MCNC: 41.3 MG/DL
HGB BLD-MCNC: 14.4 G/DL (ref 13.5–17.5)
IMM GRANULOCYTES # BLD AUTO: 0.02 X10*3/UL (ref 0–0.7)
IMM GRANULOCYTES NFR BLD AUTO: 0.3 % (ref 0–0.9)
LDLC SERPL CALC-MCNC: 101 MG/DL
LYMPHOCYTES # BLD AUTO: 0.79 X10*3/UL (ref 1.2–4.8)
LYMPHOCYTES NFR BLD AUTO: 13.4 %
MCH RBC QN AUTO: 27.6 PG (ref 26–34)
MCHC RBC AUTO-ENTMCNC: 31.6 G/DL (ref 32–36)
MCV RBC AUTO: 87 FL (ref 80–100)
MONOCYTES # BLD AUTO: 0.52 X10*3/UL (ref 0.1–1)
MONOCYTES NFR BLD AUTO: 8.8 %
NEUTROPHILS # BLD AUTO: 4.4 X10*3/UL (ref 1.2–7.7)
NEUTROPHILS NFR BLD AUTO: 74.6 %
NON HDL CHOLESTEROL: 121 MG/DL (ref 0–149)
NRBC BLD-RTO: 0 /100 WBCS (ref 0–0)
PLATELET # BLD AUTO: 177 X10*3/UL (ref 150–450)
POTASSIUM SERPL-SCNC: 4.9 MMOL/L (ref 3.5–5.3)
PROT SERPL-MCNC: 6.1 G/DL (ref 6.4–8.2)
PSA SERPL-MCNC: 4.14 NG/ML
RBC # BLD AUTO: 5.22 X10*6/UL (ref 4.5–5.9)
SODIUM SERPL-SCNC: 140 MMOL/L (ref 136–145)
T4 FREE SERPL-MCNC: 2.92 NG/DL (ref 0.61–1.12)
TRIGL SERPL-MCNC: 99 MG/DL (ref 0–149)
TSH SERPL-ACNC: <0.01 MIU/L (ref 0.44–3.98)
VLDL: 20 MG/DL (ref 0–40)
WBC # BLD AUTO: 5.9 X10*3/UL (ref 4.4–11.3)

## 2024-09-20 PROCEDURE — 84153 ASSAY OF PSA TOTAL: CPT

## 2024-09-20 PROCEDURE — 84443 ASSAY THYROID STIM HORMONE: CPT

## 2024-09-20 PROCEDURE — 36415 COLL VENOUS BLD VENIPUNCTURE: CPT

## 2024-09-20 PROCEDURE — 84439 ASSAY OF FREE THYROXINE: CPT

## 2024-09-20 PROCEDURE — 84402 ASSAY OF FREE TESTOSTERONE: CPT

## 2024-09-20 PROCEDURE — 80053 COMPREHEN METABOLIC PANEL: CPT

## 2024-09-20 PROCEDURE — 85025 COMPLETE CBC W/AUTO DIFF WBC: CPT

## 2024-09-20 PROCEDURE — 80061 LIPID PANEL: CPT

## 2024-09-20 PROCEDURE — 82306 VITAMIN D 25 HYDROXY: CPT

## 2024-09-20 PROCEDURE — 86803 HEPATITIS C AB TEST: CPT

## 2024-09-20 NOTE — TELEPHONE ENCOUNTER
I called and spoke with patient about his test results. He will need set up with  referral to endocrinology, thyroid ultrasound, thyroid uptake and scan. He will be out of town on vacation Sept. 23 through 29.

## 2024-09-23 LAB
TESTOSTERONE FREE (CHAN): 78.6 PG/ML (ref 35–155)
TESTOSTERONE,TOTAL,LC-MS/MS: 1310 NG/DL (ref 250–1100)

## 2024-10-02 ENCOUNTER — TELEPHONE (OUTPATIENT)
Dept: PRIMARY CARE | Facility: CLINIC | Age: 64
End: 2024-10-02
Payer: MEDICARE

## 2024-10-02 DIAGNOSIS — E05.90 HYPERTHYROIDISM: Primary | ICD-10-CM

## 2024-10-02 NOTE — TELEPHONE ENCOUNTER
Patel called stating that he is having a hard time finding an endocrinologist , they are up in [place] and he doesn't want to drive that far to one. He is asking if you could refer him to one closer maybe [facility]? The phone number he called from didn't match the numbers on file. [phone]

## 2024-10-02 NOTE — TELEPHONE ENCOUNTER
He can try [facility] in [place]. Order placed. He can also call his insurance to see who they cover in the area.

## 2024-10-10 ENCOUNTER — HOSPITAL ENCOUNTER (OUTPATIENT)
Dept: RADIOLOGY | Facility: HOSPITAL | Age: 64
Discharge: HOME | End: 2024-10-10
Payer: MEDICARE

## 2024-10-10 DIAGNOSIS — E05.90 HYPERTHYROIDISM: ICD-10-CM

## 2024-10-10 DIAGNOSIS — R79.89 LOW TSH LEVEL: ICD-10-CM

## 2024-10-10 DIAGNOSIS — R63.4 WEIGHT LOSS: ICD-10-CM

## 2024-10-10 PROCEDURE — A9516 IODINE I-123 SOD IODIDE MIC: HCPCS | Performed by: STUDENT IN AN ORGANIZED HEALTH CARE EDUCATION/TRAINING PROGRAM

## 2024-10-10 PROCEDURE — 3430000001 HC RX 343 DIAGNOSTIC RADIOPHARMACEUTICALS: Performed by: STUDENT IN AN ORGANIZED HEALTH CARE EDUCATION/TRAINING PROGRAM

## 2024-10-10 PROCEDURE — 78014 THYROID IMAGING W/BLOOD FLOW: CPT

## 2024-10-10 PROCEDURE — 76536 US EXAM OF HEAD AND NECK: CPT

## 2024-10-10 RX ORDER — SODIUM IODIDE I 123 200 UCI/1
400 CAPSULE, GELATIN COATED ORAL
Status: COMPLETED | OUTPATIENT
Start: 2024-10-10 | End: 2024-10-10

## 2024-10-11 ENCOUNTER — TELEPHONE (OUTPATIENT)
Dept: PRIMARY CARE | Facility: CLINIC | Age: 64
End: 2024-10-11
Payer: MEDICARE

## 2024-10-11 ENCOUNTER — HOSPITAL ENCOUNTER (OUTPATIENT)
Dept: RADIOLOGY | Facility: HOSPITAL | Age: 64
Discharge: HOME | End: 2024-10-11
Payer: MEDICARE

## 2024-10-11 NOTE — TELEPHONE ENCOUNTER
Please let patient know his thyroid scan was consistent with Grave's disease, which is a type of hyperthyroidism. When will he be seeing endocrinology? I know he couldn't schedule with Dr. Moss until April but he was trying to find someone sooner (I had put in a referral to [facility] for him).

## 2024-10-17 ENCOUNTER — APPOINTMENT (OUTPATIENT)
Dept: PRIMARY CARE | Facility: CLINIC | Age: 64
End: 2024-10-17
Payer: MEDICARE

## 2024-10-17 VITALS — BODY MASS INDEX: 20.56 KG/M2 | HEIGHT: 67 IN | WEIGHT: 131 LBS

## 2024-10-17 DIAGNOSIS — R63.4 WEIGHT LOSS: ICD-10-CM

## 2024-10-17 PROCEDURE — 99024 POSTOP FOLLOW-UP VISIT: CPT | Performed by: FAMILY MEDICINE

## 2024-10-22 ENCOUNTER — LAB (OUTPATIENT)
Dept: LAB | Facility: LAB | Age: 64
End: 2024-10-22
Payer: MEDICARE

## 2024-10-22 DIAGNOSIS — E05.90 THYROTOXICOSIS, UNSPECIFIED WITHOUT THYROTOXIC CRISIS OR STORM: Primary | ICD-10-CM

## 2024-10-22 DIAGNOSIS — E55.9 VITAMIN D DEFICIENCY, UNSPECIFIED: ICD-10-CM

## 2024-10-22 LAB
25(OH)D3 SERPL-MCNC: 48 NG/ML (ref 30–100)
T3FREE SERPL-MCNC: >10 PG/ML (ref 2.3–4.2)
T4 FREE SERPL-MCNC: 2.91 NG/DL (ref 0.61–1.12)
THYROPEROXIDASE AB SERPL-ACNC: >1000 IU/ML
TSH SERPL-ACNC: <0.01 MIU/L (ref 0.44–3.98)

## 2024-10-22 PROCEDURE — 36415 COLL VENOUS BLD VENIPUNCTURE: CPT

## 2024-10-22 PROCEDURE — 84481 FREE ASSAY (FT-3): CPT

## 2024-10-22 PROCEDURE — 82306 VITAMIN D 25 HYDROXY: CPT

## 2024-10-22 PROCEDURE — 84445 ASSAY OF TSI GLOBULIN: CPT

## 2024-10-22 PROCEDURE — 83970 ASSAY OF PARATHORMONE: CPT

## 2024-10-22 PROCEDURE — 84439 ASSAY OF FREE THYROXINE: CPT

## 2024-10-22 PROCEDURE — 83519 RIA NONANTIBODY: CPT

## 2024-10-22 PROCEDURE — 86376 MICROSOMAL ANTIBODY EACH: CPT

## 2024-10-22 PROCEDURE — 84443 ASSAY THYROID STIM HORMONE: CPT

## 2024-10-23 LAB — PTH-INTACT SERPL-MCNC: 27 PG/ML (ref 18.5–88)

## 2024-10-24 LAB — TSH RECEP AB SER-ACNC: 8.29 IU/L

## 2024-11-08 LAB — TSI SER-ACNC: 3.4 TSI INDEX

## 2024-11-19 ENCOUNTER — LAB (OUTPATIENT)
Dept: LAB | Facility: LAB | Age: 64
End: 2024-11-19
Payer: MEDICARE

## 2024-11-19 DIAGNOSIS — E05.90 THYROTOXICOSIS, UNSPECIFIED WITHOUT THYROTOXIC CRISIS OR STORM: ICD-10-CM

## 2024-11-19 DIAGNOSIS — E05.90 THYROTOXICOSIS, UNSPECIFIED WITHOUT THYROTOXIC CRISIS OR STORM: Primary | ICD-10-CM

## 2024-11-19 LAB
ALBUMIN SERPL BCP-MCNC: 3.7 G/DL (ref 3.4–5)
ALP SERPL-CCNC: 135 U/L (ref 33–136)
ALT SERPL W P-5'-P-CCNC: 60 U/L (ref 10–52)
ANION GAP SERPL CALC-SCNC: 9 MMOL/L (ref 10–20)
AST SERPL W P-5'-P-CCNC: 43 U/L (ref 9–39)
BILIRUB SERPL-MCNC: 0.6 MG/DL (ref 0–1.2)
BUN SERPL-MCNC: 20 MG/DL (ref 6–23)
CALCIUM SERPL-MCNC: 9.1 MG/DL (ref 8.6–10.3)
CHLORIDE SERPL-SCNC: 106 MMOL/L (ref 98–107)
CO2 SERPL-SCNC: 29 MMOL/L (ref 21–32)
CREAT SERPL-MCNC: 0.64 MG/DL (ref 0.5–1.3)
EGFRCR SERPLBLD CKD-EPI 2021: >90 ML/MIN/1.73M*2
ERYTHROCYTE [DISTWIDTH] IN BLOOD BY AUTOMATED COUNT: 13.9 % (ref 11.5–14.5)
GLUCOSE SERPL-MCNC: 75 MG/DL (ref 74–99)
HCT VFR BLD AUTO: 45.1 % (ref 41–52)
HGB BLD-MCNC: 14.6 G/DL (ref 13.5–17.5)
MCH RBC QN AUTO: 27.5 PG (ref 26–34)
MCHC RBC AUTO-ENTMCNC: 32.4 G/DL (ref 32–36)
MCV RBC AUTO: 85 FL (ref 80–100)
NRBC BLD-RTO: 0 /100 WBCS (ref 0–0)
PLATELET # BLD AUTO: 167 X10*3/UL (ref 150–450)
POTASSIUM SERPL-SCNC: 4.3 MMOL/L (ref 3.5–5.3)
PROT SERPL-MCNC: 6 G/DL (ref 6.4–8.2)
RBC # BLD AUTO: 5.31 X10*6/UL (ref 4.5–5.9)
SODIUM SERPL-SCNC: 140 MMOL/L (ref 136–145)
T3FREE SERPL-MCNC: 5.3 PG/ML (ref 2.3–4.2)
T4 FREE SERPL-MCNC: 1.26 NG/DL (ref 0.61–1.12)
TSH SERPL-ACNC: <0.01 MIU/L (ref 0.44–3.98)
WBC # BLD AUTO: 4.5 X10*3/UL (ref 4.4–11.3)

## 2024-11-19 PROCEDURE — 80053 COMPREHEN METABOLIC PANEL: CPT

## 2024-11-19 PROCEDURE — 85027 COMPLETE CBC AUTOMATED: CPT

## 2024-11-19 PROCEDURE — 84443 ASSAY THYROID STIM HORMONE: CPT

## 2024-11-19 PROCEDURE — 36415 COLL VENOUS BLD VENIPUNCTURE: CPT

## 2024-11-19 PROCEDURE — 84439 ASSAY OF FREE THYROXINE: CPT

## 2024-11-19 PROCEDURE — 84481 FREE ASSAY (FT-3): CPT

## 2024-11-21 ENCOUNTER — LAB (OUTPATIENT)
Dept: LAB | Facility: LAB | Age: 64
End: 2024-11-21
Payer: MEDICARE

## 2024-11-21 DIAGNOSIS — E05.90 THYROTOXICOSIS, UNSPECIFIED WITHOUT THYROTOXIC CRISIS OR STORM: Primary | ICD-10-CM

## 2024-11-21 LAB
ALBUMIN SERPL BCP-MCNC: 3.8 G/DL (ref 3.4–5)
ALP SERPL-CCNC: 149 U/L (ref 33–136)
ALT SERPL W P-5'-P-CCNC: 73 U/L (ref 10–52)
ANION GAP SERPL CALC-SCNC: 11 MMOL/L (ref 10–20)
AST SERPL W P-5'-P-CCNC: 51 U/L (ref 9–39)
BILIRUB SERPL-MCNC: 0.5 MG/DL (ref 0–1.2)
BUN SERPL-MCNC: 25 MG/DL (ref 6–23)
CALCIUM SERPL-MCNC: 8.9 MG/DL (ref 8.6–10.3)
CHLORIDE SERPL-SCNC: 106 MMOL/L (ref 98–107)
CO2 SERPL-SCNC: 28 MMOL/L (ref 21–32)
CREAT SERPL-MCNC: 0.56 MG/DL (ref 0.5–1.3)
EGFRCR SERPLBLD CKD-EPI 2021: >90 ML/MIN/1.73M*2
GLUCOSE SERPL-MCNC: 75 MG/DL (ref 74–99)
POTASSIUM SERPL-SCNC: 4.6 MMOL/L (ref 3.5–5.3)
PROT SERPL-MCNC: 6.3 G/DL (ref 6.4–8.2)
SODIUM SERPL-SCNC: 140 MMOL/L (ref 136–145)

## 2024-11-21 PROCEDURE — 36415 COLL VENOUS BLD VENIPUNCTURE: CPT

## 2024-11-21 PROCEDURE — 80053 COMPREHEN METABOLIC PANEL: CPT

## 2024-11-27 ENCOUNTER — LAB (OUTPATIENT)
Dept: LAB | Facility: LAB | Age: 64
End: 2024-11-27
Payer: MEDICARE

## 2024-11-27 DIAGNOSIS — E05.90 THYROTOXICOSIS, UNSPECIFIED WITHOUT THYROTOXIC CRISIS OR STORM: Primary | ICD-10-CM

## 2024-11-27 DIAGNOSIS — E05.90 THYROTOXICOSIS, UNSPECIFIED WITHOUT THYROTOXIC CRISIS OR STORM: ICD-10-CM

## 2024-11-27 LAB
ALBUMIN SERPL BCP-MCNC: 3.9 G/DL (ref 3.4–5)
ALP SERPL-CCNC: 137 U/L (ref 33–136)
ALT SERPL W P-5'-P-CCNC: 60 U/L (ref 10–52)
ANION GAP SERPL CALC-SCNC: 14 MMOL/L (ref 10–20)
AST SERPL W P-5'-P-CCNC: 38 U/L (ref 9–39)
BILIRUB SERPL-MCNC: 0.6 MG/DL (ref 0–1.2)
BUN SERPL-MCNC: 24 MG/DL (ref 6–23)
CALCIUM SERPL-MCNC: 9.1 MG/DL (ref 8.6–10.3)
CHLORIDE SERPL-SCNC: 105 MMOL/L (ref 98–107)
CO2 SERPL-SCNC: 28 MMOL/L (ref 21–32)
CREAT SERPL-MCNC: 0.63 MG/DL (ref 0.5–1.3)
EGFRCR SERPLBLD CKD-EPI 2021: >90 ML/MIN/1.73M*2
GLUCOSE SERPL-MCNC: 75 MG/DL (ref 74–99)
POTASSIUM SERPL-SCNC: 4.8 MMOL/L (ref 3.5–5.3)
PROT SERPL-MCNC: 6.4 G/DL (ref 6.4–8.2)
SODIUM SERPL-SCNC: 142 MMOL/L (ref 136–145)

## 2024-11-27 PROCEDURE — 36415 COLL VENOUS BLD VENIPUNCTURE: CPT

## 2024-11-27 PROCEDURE — 80053 COMPREHEN METABOLIC PANEL: CPT

## 2024-12-04 ENCOUNTER — LAB (OUTPATIENT)
Dept: LAB | Facility: LAB | Age: 64
End: 2024-12-04
Payer: MEDICARE

## 2024-12-04 DIAGNOSIS — E05.00 THYROTOXICOSIS WITH DIFFUSE GOITER WITHOUT THYROTOXIC CRISIS OR STORM: ICD-10-CM

## 2024-12-04 DIAGNOSIS — E05.00 THYROTOXICOSIS WITH DIFFUSE GOITER WITHOUT THYROTOXIC CRISIS OR STORM: Primary | ICD-10-CM

## 2024-12-04 LAB
ALBUMIN SERPL BCP-MCNC: 3.8 G/DL (ref 3.4–5)
ALP SERPL-CCNC: 135 U/L (ref 33–136)
ALT SERPL W P-5'-P-CCNC: 50 U/L (ref 10–52)
ANION GAP SERPL CALC-SCNC: 12 MMOL/L (ref 10–20)
AST SERPL W P-5'-P-CCNC: 36 U/L (ref 9–39)
BILIRUB SERPL-MCNC: 0.5 MG/DL (ref 0–1.2)
BUN SERPL-MCNC: 24 MG/DL (ref 6–23)
CALCIUM SERPL-MCNC: 9 MG/DL (ref 8.6–10.3)
CHLORIDE SERPL-SCNC: 106 MMOL/L (ref 98–107)
CO2 SERPL-SCNC: 29 MMOL/L (ref 21–32)
CREAT SERPL-MCNC: 0.64 MG/DL (ref 0.5–1.3)
EGFRCR SERPLBLD CKD-EPI 2021: >90 ML/MIN/1.73M*2
GLUCOSE SERPL-MCNC: 76 MG/DL (ref 74–99)
POTASSIUM SERPL-SCNC: 4.5 MMOL/L (ref 3.5–5.3)
PROT SERPL-MCNC: 6.1 G/DL (ref 6.4–8.2)
SODIUM SERPL-SCNC: 142 MMOL/L (ref 136–145)

## 2024-12-04 PROCEDURE — 80053 COMPREHEN METABOLIC PANEL: CPT

## 2024-12-04 PROCEDURE — 36415 COLL VENOUS BLD VENIPUNCTURE: CPT

## 2024-12-17 ENCOUNTER — LAB (OUTPATIENT)
Dept: LAB | Facility: LAB | Age: 64
End: 2024-12-17
Payer: MEDICARE

## 2024-12-17 DIAGNOSIS — E05.90 THYROTOXICOSIS, UNSPECIFIED WITHOUT THYROTOXIC CRISIS OR STORM: Primary | ICD-10-CM

## 2024-12-17 DIAGNOSIS — E05.90 THYROTOXICOSIS, UNSPECIFIED WITHOUT THYROTOXIC CRISIS OR STORM: ICD-10-CM

## 2024-12-17 DIAGNOSIS — E05.00 THYROTOXICOSIS WITH DIFFUSE GOITER WITHOUT THYROTOXIC CRISIS OR STORM: ICD-10-CM

## 2024-12-17 LAB
ALBUMIN SERPL BCP-MCNC: 3.8 G/DL (ref 3.4–5)
ALP SERPL-CCNC: 125 U/L (ref 33–136)
ALT SERPL W P-5'-P-CCNC: 50 U/L (ref 10–52)
ANION GAP SERPL CALC-SCNC: 9 MMOL/L (ref 10–20)
AST SERPL W P-5'-P-CCNC: 34 U/L (ref 9–39)
BILIRUB SERPL-MCNC: 0.5 MG/DL (ref 0–1.2)
BUN SERPL-MCNC: 23 MG/DL (ref 6–23)
CALCIUM SERPL-MCNC: 8.9 MG/DL (ref 8.6–10.3)
CHLORIDE SERPL-SCNC: 106 MMOL/L (ref 98–107)
CO2 SERPL-SCNC: 30 MMOL/L (ref 21–32)
CREAT SERPL-MCNC: 0.66 MG/DL (ref 0.5–1.3)
EGFRCR SERPLBLD CKD-EPI 2021: >90 ML/MIN/1.73M*2
GLUCOSE SERPL-MCNC: 77 MG/DL (ref 74–99)
POTASSIUM SERPL-SCNC: 4.6 MMOL/L (ref 3.5–5.3)
PROT SERPL-MCNC: 6 G/DL (ref 6.4–8.2)
SODIUM SERPL-SCNC: 140 MMOL/L (ref 136–145)
T3FREE SERPL-MCNC: 4.8 PG/ML (ref 2.3–4.2)
T4 FREE SERPL-MCNC: 1.08 NG/DL (ref 0.61–1.12)
TSH SERPL-ACNC: <0.01 MIU/L (ref 0.44–3.98)

## 2024-12-17 PROCEDURE — 84439 ASSAY OF FREE THYROXINE: CPT

## 2024-12-17 PROCEDURE — 84481 FREE ASSAY (FT-3): CPT

## 2024-12-17 PROCEDURE — 80053 COMPREHEN METABOLIC PANEL: CPT

## 2024-12-17 PROCEDURE — 36415 COLL VENOUS BLD VENIPUNCTURE: CPT

## 2024-12-17 PROCEDURE — 84443 ASSAY THYROID STIM HORMONE: CPT

## 2024-12-30 ENCOUNTER — APPOINTMENT (OUTPATIENT)
Dept: PRIMARY CARE | Facility: CLINIC | Age: 64
End: 2024-12-30
Payer: MEDICARE

## 2025-01-15 ENCOUNTER — LAB (OUTPATIENT)
Dept: LAB | Facility: LAB | Age: 65
End: 2025-01-15
Payer: MEDICARE

## 2025-01-15 DIAGNOSIS — E05.90 THYROTOXICOSIS, UNSPECIFIED WITHOUT THYROTOXIC CRISIS OR STORM: Primary | ICD-10-CM

## 2025-01-15 LAB
ALBUMIN SERPL BCP-MCNC: 4.1 G/DL (ref 3.4–5)
ALP SERPL-CCNC: 124 U/L (ref 33–136)
ALT SERPL W P-5'-P-CCNC: 38 U/L (ref 10–52)
ANION GAP SERPL CALC-SCNC: 10 MMOL/L (ref 10–20)
AST SERPL W P-5'-P-CCNC: 31 U/L (ref 9–39)
BILIRUB SERPL-MCNC: 0.5 MG/DL (ref 0–1.2)
BUN SERPL-MCNC: 20 MG/DL (ref 6–23)
CALCIUM SERPL-MCNC: 9.2 MG/DL (ref 8.6–10.3)
CHLORIDE SERPL-SCNC: 102 MMOL/L (ref 98–107)
CO2 SERPL-SCNC: 29 MMOL/L (ref 21–32)
CREAT SERPL-MCNC: 0.76 MG/DL (ref 0.5–1.3)
EGFRCR SERPLBLD CKD-EPI 2021: >90 ML/MIN/1.73M*2
ERYTHROCYTE [DISTWIDTH] IN BLOOD BY AUTOMATED COUNT: 14.8 % (ref 11.5–14.5)
GLUCOSE SERPL-MCNC: 70 MG/DL (ref 74–99)
HCT VFR BLD AUTO: 50.7 % (ref 41–52)
HGB BLD-MCNC: 16.2 G/DL (ref 13.5–17.5)
MCH RBC QN AUTO: 27.6 PG (ref 26–34)
MCHC RBC AUTO-ENTMCNC: 32 G/DL (ref 32–36)
MCV RBC AUTO: 86 FL (ref 80–100)
NRBC BLD-RTO: 0 /100 WBCS (ref 0–0)
PLATELET # BLD AUTO: 200 X10*3/UL (ref 150–450)
POTASSIUM SERPL-SCNC: 5 MMOL/L (ref 3.5–5.3)
PROT SERPL-MCNC: 6.6 G/DL (ref 6.4–8.2)
RBC # BLD AUTO: 5.88 X10*6/UL (ref 4.5–5.9)
SODIUM SERPL-SCNC: 136 MMOL/L (ref 136–145)
T3FREE SERPL-MCNC: 5 PG/ML (ref 2.3–4.2)
T4 FREE SERPL-MCNC: 0.91 NG/DL (ref 0.61–1.12)
TSH SERPL-ACNC: <0.01 MIU/L (ref 0.44–3.98)
WBC # BLD AUTO: 5.2 X10*3/UL (ref 4.4–11.3)

## 2025-01-15 PROCEDURE — 84481 FREE ASSAY (FT-3): CPT

## 2025-01-15 PROCEDURE — 85027 COMPLETE CBC AUTOMATED: CPT

## 2025-01-15 PROCEDURE — 84443 ASSAY THYROID STIM HORMONE: CPT

## 2025-01-15 PROCEDURE — 80053 COMPREHEN METABOLIC PANEL: CPT

## 2025-01-15 PROCEDURE — 84439 ASSAY OF FREE THYROXINE: CPT

## 2025-04-02 ENCOUNTER — APPOINTMENT (OUTPATIENT)
Dept: ENDOCRINOLOGY | Facility: CLINIC | Age: 65
End: 2025-04-02
Payer: MEDICARE

## 2025-04-09 ENCOUNTER — APPOINTMENT (OUTPATIENT)
Dept: PRIMARY CARE | Facility: CLINIC | Age: 65
End: 2025-04-09
Payer: MEDICARE

## 2025-04-09 VITALS
RESPIRATION RATE: 16 BRPM | WEIGHT: 142 LBS | HEIGHT: 67 IN | HEART RATE: 60 BPM | SYSTOLIC BLOOD PRESSURE: 134 MMHG | BODY MASS INDEX: 22.29 KG/M2 | DIASTOLIC BLOOD PRESSURE: 86 MMHG | OXYGEN SATURATION: 98 %

## 2025-04-09 DIAGNOSIS — S50.861A INSECT BITE OF RIGHT FOREARM, INITIAL ENCOUNTER: ICD-10-CM

## 2025-04-09 DIAGNOSIS — E78.2 MIXED HYPERLIPIDEMIA: ICD-10-CM

## 2025-04-09 DIAGNOSIS — Z23 ENCOUNTER FOR IMMUNIZATION: ICD-10-CM

## 2025-04-09 DIAGNOSIS — R97.20 ELEVATED PSA: ICD-10-CM

## 2025-04-09 DIAGNOSIS — E55.9 VITAMIN D DEFICIENCY: ICD-10-CM

## 2025-04-09 DIAGNOSIS — Z12.11 COLON CANCER SCREENING: ICD-10-CM

## 2025-04-09 DIAGNOSIS — I10 ESSENTIAL HYPERTENSION: ICD-10-CM
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
## 2025-04-09 DIAGNOSIS — E05.00 GRAVES DISEASE: ICD-10-CM

## 2025-04-09 DIAGNOSIS — N40.0 BENIGN PROSTATIC HYPERPLASIA, UNSPECIFIED WHETHER LOWER URINARY TRACT SYMPTOMS PRESENT: ICD-10-CM

## 2025-04-09 DIAGNOSIS — H40.9 GLAUCOMA OF BOTH EYES, UNSPECIFIED GLAUCOMA TYPE: ICD-10-CM

## 2025-04-09 DIAGNOSIS — N31.9 NEUROGENIC BLADDER: ICD-10-CM

## 2025-04-09 DIAGNOSIS — E05.90 HYPERTHYROIDISM: Primary | ICD-10-CM

## 2025-04-09 PROBLEM — R63.4 WEIGHT LOSS: Status: RESOLVED | Noted: 2024-09-19 | Resolved: 2025-04-09

## 2025-04-09 PROBLEM — R33.9 URINARY RETENTION: Status: RESOLVED | Noted: 2023-09-21 | Resolved: 2025-04-09

## 2025-04-09 PROBLEM — R79.89 LOW TSH LEVEL: Status: RESOLVED | Noted: 2024-09-20 | Resolved: 2025-04-09

## 2025-04-09 PROCEDURE — G2211 COMPLEX E/M VISIT ADD ON: HCPCS | Performed by: FAMILY MEDICINE

## 2025-04-09 PROCEDURE — 1160F RVW MEDS BY RX/DR IN RCRD: CPT | Performed by: FAMILY MEDICINE

## 2025-04-09 PROCEDURE — 3079F DIAST BP 80-89 MM HG: CPT | Performed by: FAMILY MEDICINE

## 2025-04-09 PROCEDURE — 3075F SYST BP GE 130 - 139MM HG: CPT | Performed by: FAMILY MEDICINE

## 2025-04-09 PROCEDURE — 3008F BODY MASS INDEX DOCD: CPT | Performed by: FAMILY MEDICINE

## 2025-04-09 PROCEDURE — 1159F MED LIST DOCD IN RCRD: CPT | Performed by: FAMILY MEDICINE

## 2025-04-09 PROCEDURE — 99214 OFFICE O/P EST MOD 30 MIN: CPT | Performed by: FAMILY MEDICINE

## 2025-04-09 PROCEDURE — 1123F ACP DISCUSS/DSCN MKR DOCD: CPT | Performed by: FAMILY MEDICINE

## 2025-04-09 PROCEDURE — 1036F TOBACCO NON-USER: CPT | Performed by: FAMILY MEDICINE

## 2025-04-09 RX ORDER — CHOLESTYRAMINE 4 G/9G
1 POWDER, FOR SUSPENSION ORAL EVERY 24 HOURS
Start: 2025-04-09

## 2025-04-09 RX ORDER — CHOLESTYRAMINE 4 G/9G
POWDER, FOR SUSPENSION ORAL EVERY 24 HOURS
COMMUNITY
Start: 2025-04-02 | End: 2025-04-09 | Stop reason: SDUPTHER

## 2025-04-09 RX ORDER — MV-MIN/FOLIC/K1/LYCOPEN/LUTEIN 200-15 MCG
TABLET ORAL EVERY 24 HOURS
COMMUNITY
End: 2025-04-09 | Stop reason: ALTCHOICE

## 2025-04-09 RX ORDER — HYDROCORTISONE 25 MG/G
CREAM TOPICAL 2 TIMES DAILY PRN
Qty: 30 G | Refills: 0 | Status: SHIPPED | OUTPATIENT
Start: 2025-04-09 | End: 2026-04-09

## 2025-04-09 RX ORDER — PROPRANOLOL HYDROCHLORIDE 20 MG/1
1 TABLET ORAL 2 TIMES DAILY
COMMUNITY
End: 2025-04-09 | Stop reason: SDUPTHER

## 2025-04-09 RX ORDER — PROPRANOLOL HYDROCHLORIDE 20 MG/1
20 TABLET ORAL 2 TIMES DAILY
Start: 2025-04-09

## 2025-04-09 ASSESSMENT — ENCOUNTER SYMPTOMS
LIGHT-HEADEDNESS: 0
PALPITATIONS: 0
POLYPHAGIA: 0
ADENOPATHY: 0
CHILLS: 0
COUGH: 0
DIAPHORESIS: 0
POLYDIPSIA: 0
DYSURIA: 0
NUMBNESS: 0
ABDOMINAL PAIN: 0
SINUS PAIN: 0
SORE THROAT: 0
NAUSEA: 0
SHORTNESS OF BREATH: 0
HEADACHES: 0
WHEEZING: 0
NERVOUS/ANXIOUS: 0
DIARRHEA: 0
VOMITING: 0
DYSPHORIC MOOD: 0
HEMATURIA: 0
SINUS PRESSURE: 0
DIZZINESS: 0
CONSTIPATION: 0
FREQUENCY: 0
FEVER: 0
CONFUSION: 0
UNEXPECTED WEIGHT CHANGE: 0
CHEST TIGHTNESS: 0

## 2025-04-09 ASSESSMENT — PATIENT HEALTH QUESTIONNAIRE - PHQ9
1. LITTLE INTEREST OR PLEASURE IN DOING THINGS: NOT AT ALL
2. FEELING DOWN, DEPRESSED OR HOPELESS: NOT AT ALL
SUM OF ALL RESPONSES TO PHQ9 QUESTIONS 1 & 2: 0

## 2025-04-09 NOTE — ASSESSMENT & PLAN NOTE
- following with endocrinology   - had elevated LFTs with methimazole and then with propylthiouracil   he will be seeing endocrine surgery (Dr. Mayfield) to discuss surgical options.

## 2025-04-09 NOTE — PATIENT INSTRUCTIONS
Patel GARCES Post ,    Thank you for coming in today. We at [facility] appreciate your trust in our care. If you have any questions or concerns about the care you received today, please do not hesitate to contact us at [phone].    The following instructions were discussed today:    - Follow up 9/22/2025 as scheduled.    - Please get blood work done 1-2 weeks prior to your next visit. For blood work: Nothing to eat or drink for at least 10 hours prior. Okay for water or black coffee.

## 2025-04-09 NOTE — ASSESSMENT & PLAN NOTE
- due to Grave's  - following with endocrinology   - had elevated LFTs with methimazole and then with propylthiouracil   he will be seeing endocrine surgery (Dr. Mayfield) to discuss surgical options.

## 2025-04-09 NOTE — ASSESSMENT & PLAN NOTE
- hydrocortisone cream x 1 week to decrease inflammation   - Call if symptoms worsen or persist.

## 2025-04-09 NOTE — PROGRESS NOTES
Subjective   Patient ID: Patel Greer is a 64 y.o. male who presents for follow up (Pt saw Dr. Fer Yanez regarding thyroid.  She recommended he get his thyroid removed./[facility] /Also needs his arm looked at, something bit him??).    HPI   routine follow up. chronic issues as per assessment and plan. This was for hyperthyroidism due to Graves disease     Saw endocrinology, Dr. Yanez, on 4/2/25. Note reviewed.  Initiated methiamzole 10mg three times daily on 10-. Developed elevated liver enzymes and so methimazole was discontinued on 11- and propranolol intiated. Liver enzyes normalized after discontinuation of methimazole one week later. Propylthiouracil 100 mg BID initiated 12-4-2024. Developed elevated liver enzymes and discontinued propylthiouracil in 3-2025 after which liver enzymes normalized. Now he will be seeing endocrine surgery (Dr. Mayfield) to discuss surgical options.     Has lesion on right forearm. Noticed it last week. Not painful but it is itchy. Thinks he may have been bitten by an insect.    Review of Systems   Constitutional:  Negative for chills, diaphoresis, fever and unexpected weight change.   HENT:  Negative for congestion, sinus pressure, sinus pain, sneezing and sore throat.    Respiratory:  Negative for cough, chest tightness, shortness of breath and wheezing.    Cardiovascular:  Negative for chest pain, palpitations and leg swelling.   Gastrointestinal:  Negative for abdominal pain, constipation, diarrhea, nausea and vomiting.   Endocrine: Negative for cold intolerance, heat intolerance, polydipsia, polyphagia and polyuria.   Genitourinary:  Negative for dysuria, frequency, hematuria and urgency.   Neurological:  Negative for dizziness, syncope, light-headedness, numbness and headaches.   Hematological:  Negative for adenopathy.   Psychiatric/Behavioral:  Negative for confusion and dysphoric mood. The patient is not  "nervous/anxious.        Objective   /86 (BP Location: Right arm, Patient Position: Sitting, BP Cuff Size: Adult)   Pulse 60   Resp 16   Ht 1.702 m (5' 7\")   Wt 64.4 kg (142 lb)   SpO2 98%   BMI 22.24 kg/m²     Physical Exam  Vitals and nursing note reviewed.   Constitutional:       General: He is not in acute distress.     Appearance: Normal appearance.   HENT:      Head: Normocephalic and atraumatic.      Nose: Nose normal.   Eyes:      Extraocular Movements: Extraocular movements intact.      Conjunctiva/sclera: Conjunctivae normal.      Pupils: Pupils are equal, round, and reactive to light.   Cardiovascular:      Rate and Rhythm: Normal rate and regular rhythm.      Heart sounds: No murmur heard.     No friction rub. No gallop.   Pulmonary:      Effort: Pulmonary effort is normal.      Breath sounds: Normal breath sounds. No wheezing, rhonchi or rales.   Abdominal:      General: Bowel sounds are normal. There is no distension.      Palpations: Abdomen is soft.      Tenderness: There is no abdominal tenderness.   Musculoskeletal:         General: Normal range of motion.      Cervical back: Normal range of motion and neck supple.   Skin:     General: Skin is warm and dry.   Neurological:      General: No focal deficit present.      Mental Status: He is alert and oriented to person, place, and time.      Deep Tendon Reflexes: Reflexes normal.   Psychiatric:         Mood and Affect: Mood normal.         Behavior: Behavior normal.         Thought Content: Thought content normal.         Judgment: Judgment normal.         Assessment/Plan   Problem List Items Addressed This Visit             ICD-10-CM    BPH (benign prostatic hyperplasia) N40.0     - follows with urology, Dr. Kolb           Relevant Orders    Vitamin B12    CBC and Auto Differential    Comprehensive Metabolic Panel    Lipid Panel    TSH with reflex to Free T4 if abnormal    Colon cancer screening Z12.11     - negative cologuard November " 2023; repeat 2026             Elevated PSA R97.20     - follows with urology, Dr. Kolb  - negative prostate biopsy 2013             Encounter for immunization Z23     - recommended the following vaccines at the pharmacy: Shingrix             Essential hypertension I10     - controlled off medication  - continue to monitor             Relevant Orders    Vitamin B12    CBC and Auto Differential    Comprehensive Metabolic Panel    Lipid Panel    TSH with reflex to Free T4 if abnormal    Glaucoma, bilateral H40.9     - follows with ophthalmology              Graves disease E05.00     - following with endocrinology   - had elevated LFTs with methimazole and then with propylthiouracil   he will be seeing endocrine surgery (Dr. Mayfield) to discuss surgical options.          Relevant Medications    cholestyramine (Questran) 4 gram packet    propranolol (Inderal) 20 mg tablet    Other Relevant Orders    Vitamin B12    CBC and Auto Differential    Comprehensive Metabolic Panel    Lipid Panel    TSH with reflex to Free T4 if abnormal    Hyperthyroidism - Primary E05.90     - due to Grave's  - following with endocrinology   - had elevated LFTs with methimazole and then with propylthiouracil   he will be seeing endocrine surgery (Dr. Mayfield) to discuss surgical options.          Relevant Medications    cholestyramine (Questran) 4 gram packet    propranolol (Inderal) 20 mg tablet    Other Relevant Orders    Vitamin B12    CBC and Auto Differential    Comprehensive Metabolic Panel    Lipid Panel    TSH with reflex to Free T4 if abnormal    Insect bite of right forearm S50.861A, W57.XXXA     - hydrocortisone cream x 1 week to decrease inflammation   - Call if symptoms worsen or persist.           Relevant Medications    hydrocortisone 2.5 % cream    Mixed hyperlipidemia E78.2     - Encouraged healthy lifestyle, including adequate exercise and high fiber, low fat and low carb diet.            Relevant Orders    Lipid Panel     Neurogenic bladder N31.9     - follows with urology, Dr. Kolb             Vitamin D deficiency E55.9    Relevant Orders    Vitamin D 25-Hydroxy,Total (for eval of Vitamin D levels)    Vitamin B12    CBC and Auto Differential    Comprehensive Metabolic Panel    Lipid Panel    TSH with reflex to Free T4 if abnormal

## 2025-05-12 ENCOUNTER — APPOINTMENT (OUTPATIENT)
Dept: UROLOGY | Facility: CLINIC | Age: 65
End: 2025-05-12
Payer: MEDICARE

## 2025-05-12 DIAGNOSIS — R97.20 ELEVATED PSA: Primary | ICD-10-CM

## 2025-05-12 DIAGNOSIS — N40.0 BENIGN PROSTATIC HYPERPLASIA, UNSPECIFIED WHETHER LOWER URINARY TRACT SYMPTOMS PRESENT: ICD-10-CM

## 2025-05-12 LAB
POC APPEARANCE, URINE: CLEAR
POC BILIRUBIN, URINE: NEGATIVE
POC BLOOD, URINE: NEGATIVE
POC COLOR, URINE: YELLOW
POC GLUCOSE, URINE: NEGATIVE MG/DL
POC KETONES, URINE: NEGATIVE MG/DL
POC LEUKOCYTES, URINE: NEGATIVE
POC NITRITE,URINE: NEGATIVE
POC PH, URINE: 6 PH
POC PROTEIN, URINE: NEGATIVE MG/DL
POC SPECIFIC GRAVITY, URINE: 1.02
POC UROBILINOGEN, URINE: 0.2 EU/DL

## 2025-05-12 PROCEDURE — 1036F TOBACCO NON-USER: CPT | Performed by: UROLOGY

## 2025-05-12 PROCEDURE — 99213 OFFICE O/P EST LOW 20 MIN: CPT | Performed by: UROLOGY

## 2025-05-12 PROCEDURE — 81003 URINALYSIS AUTO W/O SCOPE: CPT | Performed by: UROLOGY

## 2025-05-12 PROCEDURE — 1159F MED LIST DOCD IN RCRD: CPT | Performed by: UROLOGY

## 2025-05-12 RX ORDER — FINASTERIDE 5 MG/1
5 TABLET, FILM COATED ORAL DAILY
Qty: 90 TABLET | Refills: 3 | Status: SHIPPED | OUTPATIENT
Start: 2025-05-12 | End: 2026-05-12

## 2025-05-12 RX ORDER — PREDNISONE 5 MG/1
5 TABLET ORAL DAILY
COMMUNITY

## 2025-05-12 NOTE — PATIENT INSTRUCTIONS
Impression  History of urinary retention  BPH, status post the PVP  Urinary retention  Elevated PSA, multiple negative biopsy     Plan  Continue Proscar 5 mg daily   Yearly GRAYSON and PSA

## 2025-05-12 NOTE — PROGRESS NOTES
05/12/2025  Voiding well on Proscar 5 mg daily    Patient has no nausea, no vomiting, no fever.    GI: Deferred    PSA  9/20/2024 4.14  3/19/2024 12.9    We discussed benign prostate hypertrophy, Flomax, dizzy spell  We discussed the DC Flomax, Proscar trial 5 mg daily for 30 days  We discussed elevated PSA but stable, repeat prostate biopsy versus continue monitoring PSA  All the questions were answered, the patient expressed understanding and agreed to the plan.     Impression  History of urinary retention  BPH, status post the PVP  Urinary retention  Elevated PSA, multiple negative biopsy     Plan  Continue Proscar 5 mg daily   Yearly GRAYSON and PSA    Chief Complaint   Patient presents with    Elevated PSA     Pt is here today for a yearly follow up. He denies any voiding issues at this time.         Physical Exam     TODAYS LAB RESULTS:  Lab Results   Component Value Date    PSA 4.14 (H) 09/20/2024    PSA 12.90 (H) 03/19/2024    PSA 13.27 (H) 09/22/2023     POCT UA Automated manually resulted  Order: [id number]   Status: Final result       Next appt: 09/22/2025 at 10:00 AM in [facility] (Britt Dawkins MD)       Dx: Benign prostatic hyperplasia, unspeci...    Test Result Released: Yes (not seen)    0 Result Notes       Component  Ref Range & Units 09:45 1 yr ago   POC Color, Urine  Straw, Yellow, Light-Yellow Yellow    POC Appearance, Urine  Clear Clear    POC Glucose, Urine  NEGATIVE mg/dl NEGATIVE NEGATIVE   POC Bilirubin, Urine  NEGATIVE NEGATIVE NEGATIVE   POC Ketones, Urine  NEGATIVE mg/dl NEGATIVE NEGATIVE   POC Specific Gravity, Urine  1.005 - 1.035 1.020 1.010   POC Blood, Urine  NEGATIVE NEGATIVE NEGATIVE   POC PH, Urine  No Reference Range Established PH 6.0 7.0   POC Protein, Urine  NEGATIVE mg/dl NEGATIVE NEGATIVE R   POC Urobilinogen, Urine  0.2, 1.0 EU/DL 0.2 0.2   Poc Nitrite, Urine  NEGATIVE NEGATIVE NEGATIVE   POC Leukocytes, Urine  NEGATIVE NEGATIVE NEGATIVE             Specimen Collected:  05/12/25 09:45 Last Resulted: 05/12/25 09:45       ASSESSMENT&PLAN:      IMPRESSIONS:     4/26/2024  Complaining some dizzy spell, voiding well, on Flomax 0.8 mg daily     Patient has no nausea, no vomiting, no fever.     GRAYSON: Deferred     PSA: 12.9, stable     We discussed benign prostate hypertrophy, Flomax, dizzy spell  We discussed the DC Flomax, Proscar trial 5 mg daily for 30 days  We discussed elevated PSA but stable, repeat prostate biopsy versus continue monitoring PSA  All the questions were answered, the patient expressed understanding and agreed to the plan.     Impression  History of urinary retention  BPH, status post the PVP  Urinary retention  Elevated PSA, multiple negative biopsy     Plan  DC Flomax  Proscar 5 mg daily x 30-day, call back for refill  Yearly GRAYSON and PSA             Chief Complaint   Patient presents with    Benign Prostatic Hypertrophy       Patient here today for yearly follow up due to BPH.  Patient voiding well, nocturia x 2  Patient states the Flomax has been making him very dizzy, would like to discuss.     PSA 3/19/2024  12.90     Last Visit Plan:  Continue Flomax 0.8 mg  Check PSA            Physical Exam      TODAYS LAB RESULTS:  Component  Ref Range & Units 08:08   POC Glucose, Urine  NEGATIVE mg/dl NEGATIVE   POC Bilirubin, Urine  NEGATIVE NEGATIVE   POC Ketones, Urine  NEGATIVE mg/dl NEGATIVE   POC Specific Gravity, Urine  1.005 - 1.035 1.010   POC Blood, Urine  NEGATIVE NEGATIVE   POC PH, Urine  No Reference Range Established PH 7.0   POC Protein, Urine  NEGATIVE, 30 (1+) mg/dl NEGATIVE   POC Urobilinogen, Urine  0.2, 1.0 EU/DL 0.2   Poc Nitrite, Urine  NEGATIVE NEGATIVE   POC Leukocytes, Urine  NEGATIVE NEGATIVE         ASSESSMENT&PLAN:        IMPRESSIONS:      08/14/2023  Patient here for second voiding trial     Patient here today 1 week f/u. Patient presents with Indwelling catheter placed 8/8/2023.  Patient had green light laser 8/1/2023. Patient would like to talk  "to Dr. Kolb regarding next \"steps.\"  Sowmya Gibbons LPN     We discussed benign prostate hypertrophy, status post the PVP, failed voiding trial x1 and a repeat voiding trial needed  We discussed continue Flomax 0.8 mg daily for now  All the questions were answered, the patient expressed understanding and agreed to the plan.     Impression  Intractable urinary retention  BPH  Urinary retention  Elevated PSA, multiple negative biopsy     Plan  Voiding trial x 2, call back 3 PM  Appointment in 1 week for PVR  Continue Flomax 0.8 mg daily for now        08/07/2023  Did well after PVP     Patient has no nausea, no vomiting, no fever.     Exam: Sharp catheter in place, urine clear     Catheter was removed without difficulty     Patient here today for 1wk follow up after green light laser prostatectomy done 8/01/23.      Patient would like to know if he is to continue the Flomax 0.8mg daily.  He also wants to know if he is able to do any exercising/if he has restrictions.      [facility]     We discussed the benign prostate hypertrophy, status post PVP greenlight laser prostatectomy, voiding trial  We discussed continue Flomax 2 a day until finished the bottle  All the questions were answered, the patient expressed understanding and agreed to the plan.     Impression  Intractable urinary retention  BPH  Urinary retention  Elevated PSA, multiple negative biopsy     Plan  Voiding trial, call back 3 PM  Appointment in 1 week for PVR  Continue Flomax 0.8 mg daily for now        07/13/2023  Cystoscopy     A cystoscopy was performed under local without difficulty     Findings: Normal anterior urethra, mild to moderate enlarged prostate, mild trabeculated bladder no tumor no stone     Pain evaluation: 0/10 before, 2/10 after.     Transrectal ultrasound prostate performed without difficulty     Volume 55 cc     We discussed benign prostate hypertrophy, intractable urinary retention  We discussed the PVP greenlight laser " prostatectomy, indication risk of benefit and alternative,  All the questions were answered, the patient expressed understanding and agreed to the plan.        Impression  Intractable urinary retention  BPH  Urinary retention  Elevated PSA, multiple negative biopsy     Plan  Continue Sharp catheter for now  PVP greenlight laser prostatectomy  Appointment 1 week after        07/07/2023 3:06 addendum     Patient unable to void, PVR more than 350 cc, very anxious     Plan  Sharp catheter  Prostate ultrasound  Cystoscopy  Likely proceed PVP greenlight laser prostatectomy        07/07/2023  Here for second voiding trial     Exam: Sharp catheter in place, urine clear yellow     Sharp catheter was removed without difficulty     Patient here today for 2nd 1wk voiding trial. He failed last voiding trial done 6/30/23.   Urine culture 6/22/23 negative.      Patient is wondering if he needs needs to increase Flomax dosage to help with voiding issues? He also had Cataract surgery in April and is wondering if this is related to retention as well.   Patient is very anxious about voiding trial and why he is having these issues.     -JMorgenstern CMA     We discussed benign prostate hypertrophy, acute urinary retention, second voiding trial   We discussed increase Flomax to 0.8 mg daily  We discussed possible BPH work-up including ultrasound and cystoscopy  All the questions were answered, the patient expressed understanding and agreed to the plan.     Impression  Acute urinary retention  BPH  Urinary retention  Elevated PSA     Plan  Voiding trial today, call back 2 PM  Change Flomax to 0.8 mg daily  Keep regular appointment        06/30/2023  Acute onset of urinary retention, Sharp catheter was inserted a week ago in ER     Patient has no nausea, no vomiting, no fever.     Exam: Sharp catheter in place, urine clear yellow     Catheter was removed without difficulty     Patient here for ED follow up. Patient went to ER 06/22/23  complaints of urinary retention. Catheter was inserted and 750ml was drained from bladder. He is to follow up here, there has been blood around the tip of his penis. No blood in bag. He is still taking flomax daily.      We discussed benign prostate hypertrophy, acute urinary retention, voiding trial  We discussed continue Flomax use  All the questions were answered, the patient expressed understanding and agreed to the plan.     Impression  Acute urinary retention  BPH  Urinary retention  Elevated PSA     Plan  Voiding trial today, call back 2 PM  Call back in 1 week  Keep regular appointment           05/15/2023  Voiding well, nocturia 0-1, on Flomax 0.4 mg daily     Patient has no nausea, no vomiting, no fever.      GRAYSON: 2+, no nodule     PSA: Pending     Patient here today for year check of BPH, elevated PSA, urinary retention. He was to continue Flomax 0.8mg daily and had catheter in for 1wk, with successful voiding trial on 6/27/22. He did not follow up in office for PVR.   Patient denies dysuria, burning, hematuria. Nocturia x1 occasionally. Denies weak or intermittent urinary stream, states he feels he can void fine with no issues.  Patient is taking Flomax 0.4mg daily- he will need a refill of this.   PVR 53ml  -JMorgenstern CMA     IO Glucose - Urine Negative REQUIRED    IO Bilirubin Negative REQUIRED    IO Ketones Negative REQUIRED    IO Specific Gravity 1.020 REQUIRED    IO Blood Negative REQUIRED    IO pH 7.0 REQUIRED    IO Protein, Urine Negative REQUIRED    IO Urobilinogen Normal (0.2-1.0 mg/dl) REQUIRED    IO Nitrite, Urine Negative REQUIRED    IO Leukocytes Negative      We discussed the benign prostate hypertrophy with moderate voiding symptoms on Flomax  We discussed PSA screening once a year  All the questions were answered, the patient expressed understanding and agreed to the plan.     Impression  BPH  Urinary retention  Elevated PSA     Plan  PSA now  Continue Flomax 0.4 mg daily   Yearly GRAYSON  and PSA        06/20/2022  Patient developed an acute onset urinary retention     Patient has no nausea, no vomiting, no fever.     Patient here today not able to void. Has not been able to urinate since 8 am and took flomax and still can not urinate. Patient had 3 cups of coffee this morning. He has not drank anything else since. Patient tried walking and exercising, /112    PVR 553ml     We discussed acute urinary retention, Sharp catheter  All the questions were answered, the patient expressed understanding and agreed to the plan.     Impression  BPH  Urinary retention  Elevated PSA     Plan  Continue Flomax 0.8 mg daily   Sharp catheter in 1 week  Appointment in 1 week           05/06/2022  Voiding well, on Flomax 0.4 mg daily     Patient has no nausea, no vomiting, no fever.     GRAYSON: 2+, no nodule     Patient is here today for an annual follow up for BPH, elevated PSA     PSA 4/21/22 11.54     Test Result Flag Reference Goal Last Verified    IO Glucose - Urine Negative    IO Bilirubin Negative    IO Ketones Negative    IO Specific Gravity 1.020    IO Blood Trace    IO pH 7.0    IO Protein, Urine Negative    IO Urobilinogen Normal (0.2-1.0 mg/dl)    IO Nitrite, Urine Negative    IO Leukocytes Negative      We discussed the benign prostate hypertrophy, urinary retention, voiding trial  We discussed Flomax trial 0.4 mg daily x30  We discussed the PSA elevation, repeat PSA  All the questions were answered, the patient expressed understanding and agreed to the plan.     Impression  BPH  Urinary retention  Elevated PSA     Plan  Continue Flomax 0.4 mg daily   Yearly GRAYSON and a PSA     05/06/2021  Voiding well on Flomax 0.4 mg daily     Patient has no nausea, no vomiting, no fever.        IO Ultrasound, measurement post-void resid urine and/or bl cap; no imag         [id number] 01:47PM         Medhat Kolb     Test Name       Result     Flag        Reference  IO Ultrasound, measurement post void resid urine  and/or bl cap; non-imag       11 ml/min                                                                                                                      IO UA (automated w/o microscopy)           [id number] 01:40PM         Medhat Kolb     Test Name       Result     Flag        Reference  IO Glucose - Urine         Negative                  IO Bilirubin       Negative                  IO Ketones      Negative                  IO Specific Gravity         1.030                       IO Blood          Trace                       IO pH               5.5                           IO Protein, Urine            Negative                  IO Urobilinogen                                              Normal (0.2-1.0 mg/dl)  IO Nitrite, Urine              Negative                  IO Leukocytes Negative                  IO Glucose - Urine         Negative                  IO Bilirubin       Negative                  IO Ketones      Negative                  IO Specific Gravity         1.030                       IO Blood          Trace                       IO pH               5.5                           IO Protein, Urine            Negative                  IO Urobilinogen                                              Normal (0.2-1.0 mg/dl)  IO Nitrite, Urine              Negative                  IO Leukocytes Negative                                                            We discussed the benign prostate hypertrophy, urinary retention, voiding trial  We discussed Flomax trial 0.4 mg daily x30  We discussed the PSA elevation, repeat PSA  All the questions were answered, the patient expressed understanding and agreed to the plan.     Impression  BPH  Urinary retention  Elevated PSA     Plan  Continue Flomax 0.4 mg daily   check PSA   Yearly GRAYSON and PSA        04/292021  Patient developed an acute onset urinary retention, a Sharp catheter was inserted in ER 6 days ago     Patient has no nausea, no vomiting, no fever.      Exam: Sharp catheter in place, urine clear yellow     Catheter was removed without difficulty     We discussed the benign prostate hypertrophy, urinary retention, voiding trial  We discussed Flomax trial 0.4 mg daily x30  We discussed the PSA elevation, repeat PSA  All the questions were answered, the patient expressed understanding and agreed to the plan.     Impression  BPH  Urinary retention  Elevated PSA     Plan  Voiding trial today, call back 3 PM  Flomax 0.4 mg daily trial x30  Appointment in 1 week for PVR  We will check PSA then     I spent 25 minutes with this patient. Greater than 50% of this time was spent in counseling and/or coordination of care     8/31/17  Patient feels 100% better     CT abdomen and pelvis without contrast  IMPRESSION:  No obstructive uropathy. No hydronephrosis or hydroureter.     Pinpoint nonobstructing left mid to lower pole calculus and grossly stable 1.4cm left renal cyst.     We discussed the CT scan results, musculoskeletal, pain. We also discussed elevated PSA, indication all repeat prostate biopsy but the patient will decided not to proceed right now., Patient expressed understanding and agreed to the planned.     Plan   watch elevated PSA  PSA in 6 months  Appointment in 6 months     8/28/17  Acute onset of left flank pain, 8 out of 10, pains related to the position of the body     No fever no voiding problem     We discussed the flank pain, causes #1 musculoskeletal #2 kidney etc. all questions answered, patient expressed understanding and agreed to the plan     Plan  Norco 30 tablets  CT abdomen and pelvis without contrast for left acute onset of flank pain  Appointment Thursday  We'll discuss the repeat prostate biopsy again     8/4/17  patient's back for repeat PSA     Repeat PSA 11.88      (4/26/17) renal ultrasound 1.3 cm left kidney stone     We discussed persistent elevated PSA, indication P prostate biopsy; also discussed the renal ultrasound results showing  left kidney stone, all questions answered, patient agreed to the planned     Plan  KUB for left kidney stone, possible ESWL if positive;  Patient will talk to his wife about the repeat prostate biopsy and call back  Appointment in 1 month        5/5/17  Voiding well, no complaints     PSA up to 12.38     We discussed elevated PSA, repeat prostate biopsy, risk and benefit. Patient hesitated to have biopsy again and rather to repeat the PSA in 3 months. With fully understanding the risk and benefit.     Plan  PSA in 3 months  Appointment in 3 months        Office  11/18/16 elevated PSA  Voiding well, nocturia Ã--1 PSA 9  GRAYSON 3+  Renal ultrasound renal cyst no change  Plan  PSA in 6 months, when year  Appointment in 1 year     11/20/15 cyst on kidney  Voiding well  Repeat renal ultrasound on September left small renal cyst unchanged  PSA stable  Plan yearly GRAYSON and PSA  Repeat renal ultrasound in a year     PSA  9/20/2024 4.14  3/19/2024 12.9  9/22/2023 13.27   4/21/22 11.54  7/27/17 11.88  3/24/1712.38  11/13/16 9  11/6/15 7.37  6/5/15 8.24  5/12/14 7.18  11/11/17 9.8  3/15/13 8.0     Surgery  8/1/23 PVP greenlight laser prostatectomy  12/26/16 prostate biopsy negative  4/25/13 prostate biopsy negative

## 2025-09-22 ENCOUNTER — APPOINTMENT (OUTPATIENT)
Dept: PRIMARY CARE | Facility: CLINIC | Age: 65
End: 2025-09-22
Payer: MEDICARE

## 2026-05-11 ENCOUNTER — APPOINTMENT (OUTPATIENT)
Dept: UROLOGY | Facility: CLINIC | Age: 66
End: 2026-05-11
Payer: MEDICARE